# Patient Record
Sex: FEMALE | Race: WHITE | NOT HISPANIC OR LATINO | URBAN - METROPOLITAN AREA
[De-identification: names, ages, dates, MRNs, and addresses within clinical notes are randomized per-mention and may not be internally consistent; named-entity substitution may affect disease eponyms.]

---

## 2017-02-16 ENCOUNTER — RECORDS - HEALTHEAST (OUTPATIENT)
Dept: LAB | Facility: CLINIC | Age: 82
End: 2017-02-16

## 2017-02-16 LAB
CHOLEST SERPL-MCNC: 198 MG/DL
FASTING STATUS PATIENT QL REPORTED: NORMAL
HDLC SERPL-MCNC: 63 MG/DL
LDLC SERPL CALC-MCNC: 110 MG/DL
TRIGL SERPL-MCNC: 127 MG/DL

## 2018-01-01 ENCOUNTER — COMMUNICATION - HEALTHEAST (OUTPATIENT)
Dept: GERIATRICS | Facility: CLINIC | Age: 83
End: 2018-01-01

## 2018-01-01 ENCOUNTER — OFFICE VISIT - HEALTHEAST (OUTPATIENT)
Dept: GERIATRICS | Facility: CLINIC | Age: 83
End: 2018-01-01

## 2018-01-01 ENCOUNTER — RECORDS - HEALTHEAST (OUTPATIENT)
Dept: LAB | Facility: CLINIC | Age: 83
End: 2018-01-01

## 2018-01-01 DIAGNOSIS — N30.90 CYSTITIS: ICD-10-CM

## 2018-01-01 DIAGNOSIS — R53.81 PHYSICAL DECONDITIONING: ICD-10-CM

## 2018-01-01 DIAGNOSIS — I10 HYPERTENSION: ICD-10-CM

## 2018-01-01 DIAGNOSIS — R62.7 ADULT FAILURE TO THRIVE: ICD-10-CM

## 2018-01-01 DIAGNOSIS — L89.150 PRESSURE INJURY OF SACRAL REGION, UNSTAGEABLE (H): ICD-10-CM

## 2018-01-01 DIAGNOSIS — R41.0 DELIRIUM: ICD-10-CM

## 2018-01-01 DIAGNOSIS — R29.6 FALLS FREQUENTLY: ICD-10-CM

## 2018-01-01 DIAGNOSIS — R46.89 COGNITIVE AND BEHAVIORAL CHANGES: ICD-10-CM

## 2018-01-01 DIAGNOSIS — R53.1 WEAKNESS: ICD-10-CM

## 2018-01-01 DIAGNOSIS — J44.9 CHRONIC OBSTRUCTIVE PULMONARY DISEASE, UNSPECIFIED COPD TYPE (H): ICD-10-CM

## 2018-01-01 DIAGNOSIS — E66.9 OBESITY: ICD-10-CM

## 2018-01-01 DIAGNOSIS — I87.2 CHRONIC VENOUS INSUFFICIENCY: ICD-10-CM

## 2018-01-01 DIAGNOSIS — N30.20 CHRONIC CYSTITIS: ICD-10-CM

## 2018-01-01 DIAGNOSIS — F03.91 DEMENTIA WITH BEHAVIORAL DISTURBANCE, UNSPECIFIED DEMENTIA TYPE: ICD-10-CM

## 2018-01-01 DIAGNOSIS — R41.89 COGNITIVE AND BEHAVIORAL CHANGES: ICD-10-CM

## 2018-01-01 DIAGNOSIS — M31.6 TEMPORAL ARTERITIS (H): ICD-10-CM

## 2018-01-01 DIAGNOSIS — N39.0 LOWER URINARY TRACT INFECTIOUS DISEASE: ICD-10-CM

## 2018-01-01 DIAGNOSIS — E87.6 HYPOKALEMIA: ICD-10-CM

## 2018-01-01 DIAGNOSIS — R45.1 AGITATION: ICD-10-CM

## 2018-01-01 DIAGNOSIS — I89.0 LYMPHEDEMA: ICD-10-CM

## 2018-01-01 DIAGNOSIS — I10 ESSENTIAL HYPERTENSION: ICD-10-CM

## 2018-01-01 DIAGNOSIS — R44.3 HALLUCINATIONS: ICD-10-CM

## 2018-01-01 LAB
ALBUMIN SERPL-MCNC: 2.7 G/DL (ref 3.5–5)
ALBUMIN SERPL-MCNC: 3 G/DL (ref 3.5–5)
ALBUMIN SERPL-MCNC: 3.2 G/DL (ref 3.5–5)
ALBUMIN UR-MCNC: NEGATIVE MG/DL
ALP SERPL-CCNC: 65 U/L (ref 45–120)
ALP SERPL-CCNC: 72 U/L (ref 45–120)
ALP SERPL-CCNC: 80 U/L (ref 45–120)
ALT SERPL W P-5'-P-CCNC: 11 U/L (ref 0–45)
ALT SERPL W P-5'-P-CCNC: <9 U/L (ref 0–45)
ALT SERPL W P-5'-P-CCNC: <9 U/L (ref 0–45)
AMMONIA PLAS-SCNC: 22 UMOL/L (ref 11–35)
ANION GAP SERPL CALCULATED.3IONS-SCNC: 12 MMOL/L (ref 5–18)
ANION GAP SERPL CALCULATED.3IONS-SCNC: 13 MMOL/L (ref 5–18)
ANION GAP SERPL CALCULATED.3IONS-SCNC: 14 MMOL/L (ref 5–18)
APPEARANCE UR: CLEAR
AST SERPL W P-5'-P-CCNC: 11 U/L (ref 0–40)
AST SERPL W P-5'-P-CCNC: 15 U/L (ref 0–40)
AST SERPL W P-5'-P-CCNC: 19 U/L (ref 0–40)
BASOPHILS # BLD AUTO: 0 THOU/UL (ref 0–0.2)
BASOPHILS NFR BLD AUTO: 0 % (ref 0–2)
BILIRUB DIRECT SERPL-MCNC: 0.1 MG/DL
BILIRUB SERPL-MCNC: 0.3 MG/DL (ref 0–1)
BILIRUB SERPL-MCNC: 0.3 MG/DL (ref 0–1)
BILIRUB SERPL-MCNC: 0.4 MG/DL (ref 0–1)
BILIRUB UR QL STRIP: NEGATIVE
BUN SERPL-MCNC: 22 MG/DL (ref 8–28)
BUN SERPL-MCNC: 22 MG/DL (ref 8–28)
BUN SERPL-MCNC: 24 MG/DL (ref 8–28)
C REACTIVE PROTEIN LHE: 1.8 MG/DL (ref 0–0.8)
C REACTIVE PROTEIN LHE: 4.8 MG/DL (ref 0–0.8)
CALCIUM SERPL-MCNC: 8.5 MG/DL (ref 8.5–10.5)
CALCIUM SERPL-MCNC: 8.6 MG/DL (ref 8.5–10.5)
CALCIUM SERPL-MCNC: 9.3 MG/DL (ref 8.5–10.5)
CHLORIDE BLD-SCNC: 101 MMOL/L (ref 98–107)
CHLORIDE BLD-SCNC: 96 MMOL/L (ref 98–107)
CHLORIDE BLD-SCNC: 97 MMOL/L (ref 98–107)
CHOLEST SERPL-MCNC: 164 MG/DL
CO2 SERPL-SCNC: 29 MMOL/L (ref 22–31)
CO2 SERPL-SCNC: 30 MMOL/L (ref 22–31)
CO2 SERPL-SCNC: 32 MMOL/L (ref 22–31)
COLOR UR AUTO: NORMAL
CREAT SERPL-MCNC: 1.1 MG/DL (ref 0.6–1.1)
CREAT SERPL-MCNC: 1.26 MG/DL (ref 0.6–1.1)
CREAT SERPL-MCNC: 1.31 MG/DL (ref 0.6–1.1)
EOSINOPHIL # BLD AUTO: 0.5 THOU/UL (ref 0–0.4)
EOSINOPHIL NFR BLD AUTO: 5 % (ref 0–6)
ERYTHROCYTE [DISTWIDTH] IN BLOOD BY AUTOMATED COUNT: 15 % (ref 11–14.5)
ERYTHROCYTE [DISTWIDTH] IN BLOOD BY AUTOMATED COUNT: 15.3 % (ref 11–14.5)
FASTING STATUS PATIENT QL REPORTED: NO
FOLATE SERPL-MCNC: 11.9 NG/ML
GFR SERPL CREATININE-BSD FRML MDRD: 39 ML/MIN/1.73M2
GFR SERPL CREATININE-BSD FRML MDRD: 41 ML/MIN/1.73M2
GFR SERPL CREATININE-BSD FRML MDRD: 48 ML/MIN/1.73M2
GLUCOSE BLD-MCNC: 107 MG/DL (ref 70–125)
GLUCOSE BLD-MCNC: 141 MG/DL (ref 70–125)
GLUCOSE BLD-MCNC: 96 MG/DL (ref 70–125)
GLUCOSE UR STRIP-MCNC: NEGATIVE MG/DL
HCT VFR BLD AUTO: 33.9 % (ref 35–47)
HCT VFR BLD AUTO: 34.2 % (ref 35–47)
HDLC SERPL-MCNC: 62 MG/DL
HGB BLD-MCNC: 10.1 G/DL (ref 12–16)
HGB BLD-MCNC: 10.2 G/DL (ref 12–16)
HGB UR QL STRIP: NEGATIVE
KETONES UR STRIP-MCNC: NEGATIVE MG/DL
LDLC SERPL CALC-MCNC: 88 MG/DL
LEUKOCYTE ESTERASE UR QL STRIP: NEGATIVE
LYMPHOCYTES # BLD AUTO: 2.9 THOU/UL (ref 0.8–4.4)
LYMPHOCYTES NFR BLD AUTO: 32 % (ref 20–40)
MCH RBC QN AUTO: 28.5 PG (ref 27–34)
MCH RBC QN AUTO: 28.7 PG (ref 27–34)
MCHC RBC AUTO-ENTMCNC: 29.8 G/DL (ref 32–36)
MCHC RBC AUTO-ENTMCNC: 29.8 G/DL (ref 32–36)
MCV RBC AUTO: 96 FL (ref 80–100)
MCV RBC AUTO: 96 FL (ref 80–100)
MONOCYTES # BLD AUTO: 0.7 THOU/UL (ref 0–0.9)
MONOCYTES NFR BLD AUTO: 7 % (ref 2–10)
NEUTROPHILS # BLD AUTO: 4.8 THOU/UL (ref 2–7.7)
NEUTROPHILS NFR BLD AUTO: 55 % (ref 50–70)
NITRATE UR QL: NEGATIVE
PH UR STRIP: 6.5 [PH] (ref 4.5–8)
PLATELET # BLD AUTO: 294 THOU/UL (ref 140–440)
PLATELET # BLD AUTO: 306 THOU/UL (ref 140–440)
PMV BLD AUTO: 9.1 FL (ref 8.5–12.5)
PMV BLD AUTO: 9.7 FL (ref 8.5–12.5)
POTASSIUM BLD-SCNC: 3.3 MMOL/L (ref 3.5–5)
POTASSIUM BLD-SCNC: 4 MMOL/L (ref 3.5–5)
POTASSIUM BLD-SCNC: 4.1 MMOL/L (ref 3.5–5)
POTASSIUM BLD-SCNC: 4.2 MMOL/L (ref 3.5–5)
PROT SERPL-MCNC: 6.6 G/DL (ref 6–8)
PROT SERPL-MCNC: 6.7 G/DL (ref 6–8)
PROT SERPL-MCNC: 7.1 G/DL (ref 6–8)
RBC # BLD AUTO: 3.54 MILL/UL (ref 3.8–5.4)
RBC # BLD AUTO: 3.56 MILL/UL (ref 3.8–5.4)
SODIUM SERPL-SCNC: 140 MMOL/L (ref 136–145)
SODIUM SERPL-SCNC: 141 MMOL/L (ref 136–145)
SODIUM SERPL-SCNC: 143 MMOL/L (ref 136–145)
SP GR UR STRIP: 1.01 (ref 1–1.03)
TRIGL SERPL-MCNC: 72 MG/DL
TSH SERPL DL<=0.005 MIU/L-ACNC: 1.83 UIU/ML (ref 0.3–5)
UROBILINOGEN UR STRIP-ACNC: NORMAL
VALPROATE SERPL-MCNC: 21.5 UG/ML (ref 50–150)
VALPROATE SERPL-MCNC: 23.9 UG/ML (ref 50–150)
VIT B12 SERPL-MCNC: 409 PG/ML (ref 213–816)
WBC: 10.4 THOU/UL (ref 4–11)
WBC: 8.9 THOU/UL (ref 4–11)

## 2018-01-01 RX ORDER — DIVALPROEX SODIUM 125 MG/1
250 TABLET, DELAYED RELEASE ORAL AT BEDTIME
Status: SHIPPED | COMMUNITY
Start: 2018-01-01

## 2018-01-31 ENCOUNTER — RECORDS - HEALTHEAST (OUTPATIENT)
Dept: LAB | Facility: CLINIC | Age: 83
End: 2018-01-31

## 2018-02-02 LAB
BACTERIA SPEC CULT: ABNORMAL
BACTERIA SPEC CULT: ABNORMAL

## 2018-03-08 ENCOUNTER — RECORDS - HEALTHEAST (OUTPATIENT)
Dept: LAB | Facility: CLINIC | Age: 83
End: 2018-03-08

## 2018-03-11 LAB
BACTERIA SPEC CULT: ABNORMAL
BACTERIA SPEC CULT: ABNORMAL

## 2018-03-13 ENCOUNTER — HOME CARE/HOSPICE - HEALTHEAST (OUTPATIENT)
Dept: HOME HEALTH SERVICES | Facility: HOME HEALTH | Age: 83
End: 2018-03-13

## 2019-01-01 ENCOUNTER — OFFICE VISIT - HEALTHEAST (OUTPATIENT)
Dept: GERIATRICS | Facility: CLINIC | Age: 84
End: 2019-01-01

## 2019-01-01 ENCOUNTER — RECORDS - HEALTHEAST (OUTPATIENT)
Dept: LAB | Facility: CLINIC | Age: 84
End: 2019-01-01

## 2019-01-01 ENCOUNTER — COMMUNICATION - HEALTHEAST (OUTPATIENT)
Dept: GERIATRICS | Facility: CLINIC | Age: 84
End: 2019-01-01

## 2019-01-01 DIAGNOSIS — R29.6 FALLS FREQUENTLY: ICD-10-CM

## 2019-01-01 DIAGNOSIS — N30.20 CHRONIC CYSTITIS: ICD-10-CM

## 2019-01-01 DIAGNOSIS — I10 ESSENTIAL HYPERTENSION: ICD-10-CM

## 2019-01-01 DIAGNOSIS — N39.0 LOWER URINARY TRACT INFECTIOUS DISEASE: ICD-10-CM

## 2019-01-01 DIAGNOSIS — I87.2 CHRONIC VENOUS INSUFFICIENCY: ICD-10-CM

## 2019-01-01 DIAGNOSIS — R46.89 COGNITIVE AND BEHAVIORAL CHANGES: ICD-10-CM

## 2019-01-01 DIAGNOSIS — R41.89 COGNITIVE AND BEHAVIORAL CHANGES: ICD-10-CM

## 2019-01-01 DIAGNOSIS — F03.91 DEMENTIA WITH BEHAVIORAL DISTURBANCE, UNSPECIFIED DEMENTIA TYPE: ICD-10-CM

## 2019-01-01 DIAGNOSIS — M31.6 TEMPORAL ARTERITIS (H): ICD-10-CM

## 2019-01-01 DIAGNOSIS — J44.9 CHRONIC OBSTRUCTIVE PULMONARY DISEASE, UNSPECIFIED COPD TYPE (H): ICD-10-CM

## 2019-01-01 DIAGNOSIS — F22 DELUSIONS (H): ICD-10-CM

## 2019-01-01 LAB
ALBUMIN SERPL-MCNC: 2.9 G/DL (ref 3.5–5)
ALBUMIN UR-MCNC: ABNORMAL MG/DL
ALBUMIN UR-MCNC: NEGATIVE MG/DL
ALP SERPL-CCNC: 77 U/L (ref 45–120)
ALT SERPL W P-5'-P-CCNC: 21 U/L (ref 0–45)
ANION GAP SERPL CALCULATED.3IONS-SCNC: 13 MMOL/L (ref 5–18)
ANION GAP SERPL CALCULATED.3IONS-SCNC: 16 MMOL/L (ref 5–18)
APPEARANCE UR: ABNORMAL
APPEARANCE UR: CLEAR
AST SERPL W P-5'-P-CCNC: 25 U/L (ref 0–40)
BACTERIA #/AREA URNS HPF: ABNORMAL HPF
BACTERIA #/AREA URNS HPF: ABNORMAL HPF
BACTERIA SPEC CULT: ABNORMAL
BILIRUB SERPL-MCNC: 0.4 MG/DL (ref 0–1)
BILIRUB UR QL STRIP: NEGATIVE
BILIRUB UR QL STRIP: NEGATIVE
BUN SERPL-MCNC: 23 MG/DL (ref 8–28)
BUN SERPL-MCNC: 25 MG/DL (ref 8–28)
C REACTIVE PROTEIN LHE: 3.9 MG/DL (ref 0–0.8)
CALCIUM SERPL-MCNC: 8.3 MG/DL (ref 8.5–10.5)
CALCIUM SERPL-MCNC: 8.8 MG/DL (ref 8.5–10.5)
CHLORIDE BLD-SCNC: 88 MMOL/L (ref 98–107)
CHLORIDE BLD-SCNC: 89 MMOL/L (ref 98–107)
CO2 SERPL-SCNC: 33 MMOL/L (ref 22–31)
CO2 SERPL-SCNC: 35 MMOL/L (ref 22–31)
COLOR UR AUTO: YELLOW
COLOR UR AUTO: YELLOW
CREAT SERPL-MCNC: 1.78 MG/DL (ref 0.6–1.1)
CREAT SERPL-MCNC: 2 MG/DL (ref 0.6–1.1)
ERYTHROCYTE [DISTWIDTH] IN BLOOD BY AUTOMATED COUNT: 14.7 % (ref 11–14.5)
ERYTHROCYTE [DISTWIDTH] IN BLOOD BY AUTOMATED COUNT: 14.7 % (ref 11–14.5)
ERYTHROCYTE [SEDIMENTATION RATE] IN BLOOD BY WESTERGREN METHOD: 97 MM/HR (ref 0–20)
GFR SERPL CREATININE-BSD FRML MDRD: 24 ML/MIN/1.73M2
GFR SERPL CREATININE-BSD FRML MDRD: 27 ML/MIN/1.73M2
GLUCOSE BLD-MCNC: 124 MG/DL (ref 70–125)
GLUCOSE BLD-MCNC: 160 MG/DL (ref 70–125)
GLUCOSE UR STRIP-MCNC: NEGATIVE MG/DL
GLUCOSE UR STRIP-MCNC: NEGATIVE MG/DL
HCT VFR BLD AUTO: 32.7 % (ref 35–47)
HCT VFR BLD AUTO: 34.9 % (ref 35–47)
HGB BLD-MCNC: 10.1 G/DL (ref 12–16)
HGB BLD-MCNC: 10.4 G/DL (ref 12–16)
HGB UR QL STRIP: ABNORMAL
HGB UR QL STRIP: NEGATIVE
HYALINE CASTS #/AREA URNS LPF: ABNORMAL LPF
KETONES UR STRIP-MCNC: NEGATIVE MG/DL
KETONES UR STRIP-MCNC: NEGATIVE MG/DL
LEUKOCYTE ESTERASE UR QL STRIP: ABNORMAL
LEUKOCYTE ESTERASE UR QL STRIP: ABNORMAL
MCH RBC QN AUTO: 29.1 PG (ref 27–34)
MCH RBC QN AUTO: 30 PG (ref 27–34)
MCHC RBC AUTO-ENTMCNC: 29.8 G/DL (ref 32–36)
MCHC RBC AUTO-ENTMCNC: 30.9 G/DL (ref 32–36)
MCV RBC AUTO: 97 FL (ref 80–100)
MCV RBC AUTO: 98 FL (ref 80–100)
MUCOUS THREADS #/AREA URNS LPF: ABNORMAL LPF
NITRATE UR QL: NEGATIVE
NITRATE UR QL: NEGATIVE
PH UR STRIP: 6 [PH] (ref 4.5–8)
PH UR STRIP: 6.5 [PH] (ref 4.5–8)
PLATELET # BLD AUTO: 199 THOU/UL (ref 140–440)
PLATELET # BLD AUTO: 224 THOU/UL (ref 140–440)
PMV BLD AUTO: 9.1 FL (ref 8.5–12.5)
PMV BLD AUTO: 9.3 FL (ref 8.5–12.5)
POTASSIUM BLD-SCNC: 3.6 MMOL/L (ref 3.5–5)
POTASSIUM BLD-SCNC: 4 MMOL/L (ref 3.5–5)
PROCALCITONIN SERPL-MCNC: 0.04 NG/ML (ref 0–0.49)
PROT SERPL-MCNC: 7.1 G/DL (ref 6–8)
RBC # BLD AUTO: 3.37 MILL/UL (ref 3.8–5.4)
RBC # BLD AUTO: 3.57 MILL/UL (ref 3.8–5.4)
RBC #/AREA URNS AUTO: ABNORMAL HPF
RBC #/AREA URNS AUTO: ABNORMAL HPF
SODIUM SERPL-SCNC: 137 MMOL/L (ref 136–145)
SODIUM SERPL-SCNC: 137 MMOL/L (ref 136–145)
SP GR UR STRIP: 1.01 (ref 1–1.03)
SP GR UR STRIP: 1.02 (ref 1–1.03)
SQUAMOUS #/AREA URNS AUTO: ABNORMAL LPF
SQUAMOUS #/AREA URNS AUTO: ABNORMAL LPF
UROBILINOGEN UR STRIP-ACNC: ABNORMAL
UROBILINOGEN UR STRIP-ACNC: ABNORMAL
VALPROATE SERPL-MCNC: 39.6 UG/ML (ref 50–150)
VALPROATE SERPL-MCNC: 43.2 UG/ML (ref 50–150)
VALPROATE SERPL-MCNC: 50.1 UG/ML (ref 50–150)
WBC #/AREA URNS AUTO: ABNORMAL HPF
WBC #/AREA URNS AUTO: ABNORMAL HPF
WBC: 7.4 THOU/UL (ref 4–11)
WBC: 8.5 THOU/UL (ref 4–11)

## 2019-04-24 ENCOUNTER — AMBULATORY - HEALTHEAST (OUTPATIENT)
Dept: GERIATRICS | Facility: CLINIC | Age: 84
End: 2019-04-24

## 2021-05-27 ENCOUNTER — RECORDS - HEALTHEAST (OUTPATIENT)
Dept: ADMINISTRATIVE | Facility: CLINIC | Age: 86
End: 2021-05-27

## 2021-05-30 ENCOUNTER — RECORDS - HEALTHEAST (OUTPATIENT)
Dept: ADMINISTRATIVE | Facility: CLINIC | Age: 86
End: 2021-05-30

## 2021-06-01 ENCOUNTER — RECORDS - HEALTHEAST (OUTPATIENT)
Dept: ADMINISTRATIVE | Facility: CLINIC | Age: 86
End: 2021-06-01

## 2021-06-02 VITALS — WEIGHT: 170.2 LBS | BODY MASS INDEX: 33.24 KG/M2

## 2021-06-02 VITALS — WEIGHT: 162.4 LBS | BODY MASS INDEX: 31.72 KG/M2

## 2021-06-02 VITALS — BODY MASS INDEX: 31.72 KG/M2 | WEIGHT: 162.4 LBS

## 2021-06-02 VITALS — BODY MASS INDEX: 33.47 KG/M2 | WEIGHT: 171.4 LBS

## 2021-06-02 VITALS — BODY MASS INDEX: 33.4 KG/M2 | WEIGHT: 171 LBS

## 2021-06-02 VITALS — BODY MASS INDEX: 32.11 KG/M2 | WEIGHT: 164.4 LBS

## 2021-06-02 VITALS — BODY MASS INDEX: 32.65 KG/M2 | WEIGHT: 167.2 LBS

## 2021-06-02 VITALS — WEIGHT: 170.1 LBS | BODY MASS INDEX: 33.22 KG/M2

## 2021-06-16 PROBLEM — G93.40 ENCEPHALOPATHY: Status: ACTIVE | Noted: 2019-01-01

## 2021-06-16 PROBLEM — F03.90 DEMENTIA (H): Chronic | Status: ACTIVE | Noted: 2018-01-01

## 2021-06-16 PROBLEM — E83.42 HYPOMAGNESEMIA: Status: ACTIVE | Noted: 2019-01-01

## 2021-06-16 PROBLEM — I48.0 PAROXYSMAL ATRIAL FIBRILLATION (H): Status: ACTIVE | Noted: 2019-01-01

## 2021-06-16 PROBLEM — I87.2 CHRONIC VENOUS INSUFFICIENCY: Chronic | Status: ACTIVE | Noted: 2018-01-01

## 2021-06-16 PROBLEM — R29.6 FALLS FREQUENTLY: Chronic | Status: ACTIVE | Noted: 2018-01-01

## 2021-06-16 PROBLEM — R79.89 ELEVATED TROPONIN LEVEL: Status: ACTIVE | Noted: 2019-01-01

## 2021-06-16 PROBLEM — R62.7 ADULT FAILURE TO THRIVE: Chronic | Status: ACTIVE | Noted: 2018-01-01

## 2021-06-16 PROBLEM — D64.9 ANEMIA: Status: ACTIVE | Noted: 2019-01-01

## 2021-06-16 PROBLEM — J69.0 ASPIRATION PNEUMONIA (H): Status: ACTIVE | Noted: 2019-01-01

## 2021-06-16 PROBLEM — R53.81 PHYSICAL DECONDITIONING: Status: ACTIVE | Noted: 2018-01-01

## 2021-06-16 PROBLEM — L89.150 PRESSURE INJURY OF SACRAL REGION, UNSTAGEABLE (H): Status: ACTIVE | Noted: 2018-01-01

## 2021-06-18 NOTE — LETTER
Letter by Urmila Luong MD at      Author: Urmila Luong MD Service: -- Author Type: --    Filed:  Encounter Date: 1/31/2019 Status: (Other)         Patient: Li Winston   MR Number: [id number]   YOB: 1935   Date of Visit: 1/31/2019     [facility]      Facility:    [facility] [[id number]]    Code Status: FULL CODE ( Had been DNR in hospital, son stated she wanted it full code)  [facility]    10/15 through 10/18/18  [facility]    11/2 through 11/6/18        Chief Complaint/Reason for Visit:  Chief Complaint   Patient presents with   ? Review Of Multiple Medical Conditions       HPI:   Li is a 83 y.o. female who been living in her own home with failure to thrive, had bad legs at home, not compliant with dressing and support hose, would not always take her medications, especially Lasix because she did not want to have to go to the bathroom so often, she often had wet diapers, she was having frequent falls, developed a pressure ulcer of the sacral region.  Her decline was especially notable for the previous month.  She was admitted to the hospital and found to have CK= 381( mild rhabdomyolysis).  She was treated with IV fluids, was given ceftriaxone IV for a questionable UA.  She received a total of 3 days of IV antibiotics.  The culture itself had no growth.  Per her son, she has been on suppressive antibiotic (trimethoprim 100 mg every evening) for approximately 9 months per urologist they visited.    Her son said she really was not a candidate to go home to live alone.  They are searching for long-term care/assisted living facility.    She was in TCU at [facility]: she had maría delerium and hallucinations, significant behaviors. She was sent to the ED: had a UTI ( UA was checked a few days earlier and was negative). Psychiatry started her on Seroquel with some improvement in her delirium. She transferred back to TCU, but had  escalating and ongoing hallucinations and behaviors.  Seroquel was added. She completed therapy, was in need of LTC, and transferred to LTC on the third floor at [facility].    Other medical issues:  History of COPD  Hypertension  Temporal arteritis on low-dose steroids for many years (approximately 15)  Dyslipidemia.  Osteopenia  Obesity  History of temporal arteritis still on steroids  .    UPDATE:  Has had delerium, verbal aggression, exit seeking behavior    Past Medical History:  Past Medical History:   Diagnosis Date   ? Arthritis    ? Chronic respiratory failure (H)     home O2 therapy   ? Constipation     son states pt. has a little trouble with constipation--takes miralax   ? COPD (chronic obstructive pulmonary disease) (H)    ? Diabetes mellitus (H)     son states thinks she was borderline diabetic 20 yrs ago--just watched her diet at that time   ? Hyperlipidemia    ? Hypertension    ? IBS (irritable bowel syndrome)     not as of 9/21/14   ? Influenza due to influenza virus, type A, human    ? Obesity    ? Osteopenia    ? Temporal arteritis (H)                   Review of Systems   Lethargic, responds to questions but little content in her speech.  Refuses cares at times  Due to significant dementia, she can not answer ROS    Vitals:    01/31/19 0900   BP: 155/73   Pulse: 78   Resp: 18   Temp: 97.3  F (36.3  C)   SpO2: 94%       Physical Exam    Constitutional: Elderly morbidly obese, hard of hearing   Left lower eyelid cyst, no erythema.   Cardiovascular: Regular rhythm and normal heart sounds. No murmur.   Pulmonary/Chest: Breath sounds clear  Abdominal: Soft. Bowel sounds are normal, no tenderness.   Musculoskeletal:Limited shoulder range of motion  Hand grasp 4/5, thigh flexors extensors 3/5   Neurological: She is drowsy, slouching in wheelchair.Clearly  Significant short-term memory deficits  No asymmetry   Skin: Skin is warm. Marked Lymphedema of both lower extremities, dark pink coloration. No  support hose in place  Psychiatric: Fairly flat affect.     On Bactrim for suppression of frequent cyctitis      Allergies   Allergen Reactions   ? Lorazepam Other (See Comments)     Confusion and it didn't work.   ? Other Environmental Allergy      Allergies to strong scents - soap, perfume   ? Statins-Hmg-Coa Reductase Inhibitors Myalgia     With elevated CK   ? [place]    ? Hydrocodone Rash   ? Prevacid [Lansoprazole]      Reaction: Stomach upset       Medication List:  Current Outpatient Medications   Medication Sig   ? albuterol (PROVENTIL HFA;VENTOLIN HFA) 90 mcg/actuation inhaler Inhale 2 puffs every 4 (four) hours as needed for wheezing.   ? aspirin 81 mg chewable tablet Chew 81 mg Daily at 8:00 am..   ? divalproex (DEPAKOTE DR) 125 MG 12 hour tablet Take 125 mg by mouth 2 (two) times a day.   ? furosemide (LASIX) 20 MG tablet Take by mouth see administration instructions. 40 mg in the morning and 20 mg in the afternoon.   ? ibuprofen (ADVIL,MOTRIN) 200 MG tablet Take 200 mg by mouth every 8 (eight) hours as needed for pain. OTC    ? metoprolol (TOPROL-XL) 50 MG 24 hr tablet Take 50 mg by mouth daily.   ? OXYGEN-AIR DELIVERY SYSTEMS MISC 2 L into each nostril At night PRN (SOB).   ? pediatric multivitamin (CHILDREN'S MULTIVIT W/EXTRA C) Chew chewable tablet Chew 1 tablet daily.   ? PREDNISONE ORAL Take 9 mg by mouth daily with breakfast.    ? QUEtiapine (SEROQUEL) 25 MG tablet Take 0.5 tablets (12.5 mg total) by mouth 3 (three) times a day for 2 days.   ? QUEtiapine (SEROQUEL) 50 MG tablet Take 1 tablet (50 mg total) by mouth at bedtime. (Patient taking differently: Take 50 mg by mouth 4 (four) times a day .      )   ? senna (SENOKOT) 8.6 mg tablet Take 2 tablets by mouth 2 (two) times a day as needed for constipation.   ? trimethoprim (TRIMPEX) 100 mg tablet Take 100 mg by mouth every evening.         Labs:   Ref Range & Units 11/28/18 0652   Potassium 3.5 - 5.0 mmol/L 4.2       Ref Range & Units 11/28/18  0652 11/19/18 0510   Valproic Acid 50.0 - 150.0 ug/mL 23.9 Abnormally low   21.5 Abnormally low  CM      Ref Range & Units 11/19/18 0510   Ammonia 11 - 35 umol/L 22       Ref Range & Units 11/19/18 0510 11/6/18 0729   WBC 4.0 - 11.0 thou/uL 8.9  10.3    RBC 3.80 - 5.40 mill/uL 3.54 Abnormally low   3.49 Abnormally low     Hemoglobin 12.0 - 16.0 g/dL 10.1 Abnormally low   10.1 Abnormally low     Hematocrit 35.0 - 47.0 % 33.9 Abnormally low   34.1 Abnormally low     MCV 80 - 100 fL 96  98    MCH 27.0 - 34.0 pg 28.5  28.9    MCHC 32.0 - 36.0 g/dL 29.8 Abnormally low   29.6 Abnormally low     RDW 11.0 - 14.5 % 15.3 Abnormally high   15.2 Abnormally high     Platelets 140 - 440 thou/uL 294  273         Ref Range & Units 11/6/18 0729   Sodium 136 - 145 mmol/L 140    Potassium 3.5 - 5.0 mmol/L 4.3    Chloride 98 - 107 mmol/L 101    CO2 22 - 31 mmol/L 28    Anion Gap, Calculation 5 - 18 mmol/L 11    Glucose 70 - 125 mg/dL 87    Calcium 8.5 - 10.5 mg/dL 8.7    BUN 8 - 28 mg/dL 23    Creatinine 0.60 - 1.10 mg/dL 1.16 Abnormally high     GFR MDRD Non Af Amer >60 mL/min/1.73m2 45 Abnormally low         Ref Range & Units 11/6/18    WBC 4.0 - 11.0 thou/uL 10.3    Hemoglobin 12.0 - 16.0 g/dL 10.1 Abnormally low     RDW 11.0 - 14.5 % 15.2 Abnormally high     Platelets 140 - 440 thou/uL 273       Ref Range & Units 11/6/18    Magnesium 1.8 - 2.6 mg/dL 2.3             Assessment / Plan:    ICD-10-CM    1. Dementia with behavioral disturbance, unspecified dementia type F03.91 Depakote and Seroquel helping some    2. Chronic obstructive pulmonary disease, unspecified COPD type (H) J44.9 Prednisone   3. Essential hypertension I10 Acceptable systolics for her age on Metoprolol   4. Chronic venous insufficiency I87.2 pInactive, stable, not using suoport hose   5. Chronic cystitis N30.20 Chronic Trimethoprim             Electronically signed by: Urmila Luong MD

## 2021-06-18 NOTE — LETTER
"Letter by Mayela Yañez CNP at      Author: Mayela Yañez CNP Service: -- Author Type: --    Filed:  Encounter Date: 3/11/2019 Status: (Other)         Patient: Li Winston   MR Number: [id number]   YOB: 1935   Date of Visit: 3/11/2019     [facility]    Facility:   [facility] [[id number]]   Code Status: FULL CODE      CHIEF COMPLAINT/REASON FOR VISIT:  Chief Complaint   Patient presents with   ? Problem Visit     falls, behavioral changes       HISTORY:      HPI: Li is a 83 y.o. female who been living in her own home with failure to thrive, had bad legs at home, not compliant with dressing and support hose, would not always take her medications, especially Lasix because she did not want to have to go to the bathroom so often, she often had wet diapers, she was having frequent falls, developed a pressure ulcer of the sacral region. Her decline was especially notable for the previous month. Li was admitted to the hospital and found to have CK= 381( mild rhabdomyolysis). She was treated with IV fluids, was given ceftriaxone IV for a questionable UA. She received a total of 3 days of IV antibiotics. The culture itself had no growth. Per her son, she has been on suppressive antibiotic (trimethoprim 100 mg every evening) for approximately 9 months per urologist they visited. Li has been transferred to LT.     Today Li is being evaluated after having several falls over the weekend and several issues with behavioral changes. Li feels she is fine, she is very confused (confusion is her normal state). However, nursing is states she is off and is just not \"right\". Li does not have any new issues to report. She is feeling otherwise ok. She does wince in pain or cry in pain with palpations to any of her bruised areas. Li does not have any other concerns or reports.     Past Medical History:   Diagnosis Date   ? Arthritis    ? Chronic respiratory failure (H)  "    home O2 therapy   ? Constipation     son states pt. has a little trouble with constipation--takes miralax   ? COPD (chronic obstructive pulmonary disease) (H)    ? Diabetes mellitus (H)     son states thinks she was borderline diabetic 20 yrs ago--just watched her diet at that time   ? Hyperlipidemia    ? Hypertension    ? IBS (irritable bowel syndrome)     not as of 14   ? Influenza due to influenza virus, type A, human    ? Obesity    ? Osteopenia    ? Temporal arteritis (H)              Family History   Problem Relation Age of Onset   ? Heart disease Father    ? Cancer Sister      Social History     Socioeconomic History   ? Marital status:      Spouse name: None   ? Number of children: None   ? Years of education: None   ? Highest education level: None   Occupational History   ? None   Social Needs   ? Financial resource strain: None   ? Food insecurity:     Worry: None     Inability: None   ? Transportation needs:     Medical: None     Non-medical: None   Tobacco Use   ? Smoking status: Former Smoker     Packs/day: 0.50     Years: 10.00     Pack years: 5.00     Last attempt to quit: 2000     Years since quittin.1   ? Smokeless tobacco: Never Used   ? Tobacco comment: at least 10 yrs she smoked   Substance and Sexual Activity   ? Alcohol use: No   ? Drug use: No   ? Sexual activity: None   Lifestyle   ? Physical activity:     Days per week: None     Minutes per session: None   ? Stress: None   Relationships   ? Social connections:     Talks on phone: None     Gets together: None     Attends Anabaptist service: None     Active member of club or organization: None     Attends meetings of clubs or organizations: None     Relationship status: None   ? Intimate partner violence:     Fear of current or ex partner: None     Emotionally abused: None     Physically abused: None     Forced sexual activity: None   Other Topics Concern   ? None   Social History Narrative   ? None       REVIEW OF  SYSTEM:  Per HPI    PHYSICAL EXAM:   General appearance: alert, appears stated age and cooperative  HEENT: Head is normocephalic with normal hair distribution. Facial trauma with bruising and hematoma to forehead. Ears: Without lesions or deformity. No acute purulent discharge. [place]. Eyes: Conjunctivae pink with no scleral icterus or erythema. Nose: Normal mucosa and septum. Oropharnyx: mmm, no lesions present.  Lungs: respirations without effort, LS CTA  Cardiovascular: S1, S2, irregular rhythm, regular rate  Abdomen: soft, tender to lower abdomen, +CVAT  Extremities: extremities normal, atraumatic, no cyanosis. Massive amounts of lymphedema present.   Back: kyphosis  Pulses: 2+ and symmetric  Skin: Skin color, texture, turgor normal. No rashes or lesions. Bruising over face, right shoulder.  Neurologic: Grossly normal   Psych: interacts well with caregivers, does NOT exhibits logical thought processes and connections--advanced dementia with delusions, pleasant    LABS:   CBC, CMP, Procalcitonin, UA/UC.     ASSESSMENT:      ICD-10-CM    1. Falls frequently R29.6    2. Cognitive and behavioral changes R41.89     R46.89        PLAN:    Falls  -Xray face, orbits, right shoulder, thoracic spine STAT.   -Ice to all bruised or tender areas 4 times daily at a minimum.   -Tylenol 500 mg by mouth every 6 hours.   -Sling to right arm for comfort.     Cognitive and Behavioral Changes  -CBC, CMP, Depakote level, Procalcitonin STAT.   -UA/UC.   -Careful monitoring.     Otherwise continue current care plan for all other chronic medical conditions. Encouraged patient to engage in healthy lifestyle behaviors such as engaging in social activities, exercising (PT/OT), eating well, and following care plan. Follow up for routine check-up, or sooner if needed. Will continue to monitor patient and work with nursing staff collaboratively to work toward positive patient outcomes.    Electronically signed by: Mayela Yañez CNP

## 2021-06-18 NOTE — LETTER
Letter by Mayela Yañez CNP at      Author: Mayela Yañez CNP Service: -- Author Type: --    Filed:  Encounter Date: 2/15/2019 Status: (Other)         Patient: Li Winston   MR Number: [id number]   YOB: 1935   Date of Visit: 2/15/2019     [facility]    Facility:   [facility] [[id number]]   Code Status: FULL CODE      CHIEF COMPLAINT/REASON FOR VISIT:  Chief Complaint   Patient presents with   ? Problem Visit     dysuria, behavior changes       HISTORY:      HPI: Li is a 83 y.o. female who been living in her own home with failure to thrive, had bad legs at home, not compliant with dressing and support hose, would not always take her medications, especially Lasix because she did not want to have to go to the bathroom so often, she often had wet diapers, she was having frequent falls, developed a pressure ulcer of the sacral region. Her decline was especially notable for the previous month. Li was admitted to the hospital and found to have CK= 381( mild rhabdomyolysis). She was treated with IV fluids, was given ceftriaxone IV for a questionable UA. She received a total of 3 days of IV antibiotics. The culture itself had no growth. Per her son, she has been on suppressive antibiotic (trimethoprim 100 mg every evening) for approximately 9 months per urologist they visited. Li has been transferred to [facility].     Today she is being evaluated for reports of hematuria and dysuria. This has been an issue for the past 3 to 4 days per nursing report. Li is quite demented and does not recall what is going on easily. She states her stomach is bothering her but she is mostly upset about her leg wraps. Li is perseverating on her leg wraps today and does not have anything further to add. She just does not want to wear the leg wraps because they are bothersome to her. She isn't sure if she has had any other symptoms or issues.      Past Medical History:   Diagnosis Date    ? Arthritis    ? Chronic respiratory failure (H)     home O2 therapy   ? Constipation     son states pt. has a little trouble with constipation--takes miralax   ? COPD (chronic obstructive pulmonary disease) (H)    ? Diabetes mellitus (H)     son states thinks she was borderline diabetic 20 yrs ago--just watched her diet at that time   ? Hyperlipidemia    ? Hypertension    ? IBS (irritable bowel syndrome)     not as of 14   ? Influenza due to influenza virus, type A, human    ? Obesity    ? Osteopenia    ? Temporal arteritis (H)              Family History   Problem Relation Age of Onset   ? Heart disease Father    ? Cancer Sister      Social History     Socioeconomic History   ? Marital status:      Spouse name: None   ? Number of children: None   ? Years of education: None   ? Highest education level: None   Social Needs   ? Financial resource strain: None   ? Food insecurity - worry: None   ? Food insecurity - inability: None   ? Transportation needs - medical: None   ? Transportation needs - non-medical: None   Occupational History   ? None   Tobacco Use   ? Smoking status: Former Smoker     Packs/day: 0.50     Years: 10.00     Pack years: 5.00     Last attempt to quit: 2000     Years since quittin.0   ? Smokeless tobacco: Never Used   ? Tobacco comment: at least 10 yrs she smoked   Substance and Sexual Activity   ? Alcohol use: No   ? Drug use: No   ? Sexual activity: None   Other Topics Concern   ? None   Social History Narrative   ? None       REVIEW OF SYSTEM:  Per HPI    PHYSICAL EXAM:   General appearance: alert, appears stated age and cooperative  HEENT: Head is normocephalic with normal hair distribution. No evidence of trauma. Ears: Without lesions or deformity. No acute purulent discharge. [place]. Eyes: Conjunctivae pink with no scleral icterus or erythema. Nose: Normal mucosa and septum. Oropharnyx: mmm, no lesions present.  Lungs: respirations without effort, LS  CTA  Cardiovascular: S1, S2, irregular rhythm, regular rate  Abdomen: soft, tender to lower abdomen, +CVAT  Extremities: extremities normal, atraumatic, no cyanosis. Massive amounts of lymphedema present.   Back: kyphosis  Pulses: 2+ and symmetric  Skin: Skin color, texture, turgor normal. No rashes or lesions. Legs wrapped and bandaged but history of blisters present  Neurologic: Grossly normal   Psych: interacts well with caregivers, does NOT exhibits logical thought processes and connections--advanced dementia with delusions, pleasant    LABS:   Lab Results   Component Value Date    COLORU Yellow 02/11/2019    CLARITYU Slightly Cloudy (!) 02/11/2019    GLUCOSEU Negative 02/11/2019    BILIRUBINUR Negative 02/11/2019    KETONESU Negative 02/11/2019    SPECGRAV 1.025 02/11/2019    HGBUA Large (!) 02/11/2019    PHUR 6.0 02/11/2019    PROTEINUA Negative 02/11/2019    UROBILINOGEN <2.0 E.U./dL 02/11/2019    NITRITE Negative 02/11/2019    LEUKOCYTESUR Moderate (!) 02/11/2019    BACTERIA Few (!) 02/11/2019    RBCUA 25-50 (!) 02/11/2019    WBCUA 5-10 (!) 02/11/2019    SQUAMEPI 0-5 02/11/2019     Culture   Date Value Ref Range Status   02/11/2019 >100,000 col/ml Enterococcus faecalis (!)  Final   02/11/2019 10,000-50,000 col/ml Proteus mirabilis (!)  Final       ASSESSMENT:      ICD-10-CM    1. UTI (lower urinary tract infection) N39.0        PLAN:    UTI  -Ampicillin 500 mg by mouth twice daily for 7 days.   -Encourage fluids.   -May consider the use of a prophylactic antibiotic or estrace cream to help address recurrent UTIs.   -Follow carefully and report any changes immediately to provider team.     Otherwise continue current care plan for all other chronic medical conditions. Encouraged patient to engage in healthy lifestyle behaviors such as engaging in social activities, exercising (PT/OT), eating well, and following care plan. Follow up for routine check-up, or sooner if needed. Will continue to monitor patient and  work with nursing staff collaboratively to work toward positive patient outcomes.    Electronically signed by: Mayela Yañez CNP

## 2021-06-18 NOTE — LETTER
Letter by Mayela Yañez CNP at      Author: Mayela Yañez CNP Service: -- Author Type: --    Filed:  Encounter Date: 1/30/2019 Status: (Other)         Patient: Li Winston   MR Number: [id number]   YOB: 1935   Date of Visit: 1/30/2019     [facility]    Facility:   [facility] [[id number]]   Code Status: FULL CODE      CHIEF COMPLAINT/REASON FOR VISIT:  Chief Complaint   Patient presents with   ? Problem Visit     dementia, delusions       HISTORY:      HPI: Li is a 83 y.o. female who been living in her own home with failure to thrive, had bad legs at home, not compliant with dressing and support hose, would not always take her medications, especially Lasix because she did not want to have to go to the bathroom so often, she often had wet diapers, she was having frequent falls, developed a pressure ulcer of the sacral region. Her decline was especially notable for the previous month. Li was admitted to the hospital and found to have CK= 381( mild rhabdomyolysis). She was treated with IV fluids, was given ceftriaxone IV for a questionable UA. She received a total of 3 days of IV antibiotics. The culture itself had no growth. Per her son, she has been on suppressive antibiotic (trimethoprim 100 mg every evening) for approximately 9 months per urologist they visited. Li has been transferred to LTC.     Today she is being evaluated for ongoing delusions. Li often has elaborate stories and delusions. She will often times be appropriate with her delusions and behaviors but there are times when Li will say very inappropriate things to staff and other residents. Family would like for Li to come off of the Seroquel and attempt to be more controlled with depakote. Li does not recognize these thoughts are untrue or not real. She does not have any new concerns. Li denies any concerns including fevers/chills, cough or cold symptoms, headaches, vision changes,  chest pain/pressure, difficulty breathing, SOB, abdominal pain, nausea, vomiting, diarrhea, dysuria, increasing weakness, increasing pain.     Past Medical History:   Diagnosis Date   ? Arthritis    ? Chronic respiratory failure (H)     home O2 therapy   ? Constipation     son states pt. has a little trouble with constipation--takes miralax   ? COPD (chronic obstructive pulmonary disease) (H)    ? Diabetes mellitus (H)     son states thinks she was borderline diabetic 20 yrs ago--just watched her diet at that time   ? Hyperlipidemia    ? Hypertension    ? IBS (irritable bowel syndrome)     not as of 14   ? Influenza due to influenza virus, type A, human    ? Obesity    ? Osteopenia    ? Temporal arteritis (H)              Family History   Problem Relation Age of Onset   ? Heart disease Father    ? Cancer Sister      Social History     Socioeconomic History   ? Marital status:      Spouse name: None   ? Number of children: None   ? Years of education: None   ? Highest education level: None   Social Needs   ? Financial resource strain: None   ? Food insecurity - worry: None   ? Food insecurity - inability: None   ? Transportation needs - medical: None   ? Transportation needs - non-medical: None   Occupational History   ? None   Tobacco Use   ? Smoking status: Former Smoker     Packs/day: 0.50     Years: 10.00     Pack years: 5.00     Last attempt to quit: 2000     Years since quittin.0   ? Smokeless tobacco: Never Used   ? Tobacco comment: at least 10 yrs she smoked   Substance and Sexual Activity   ? Alcohol use: No   ? Drug use: No   ? Sexual activity: None   Other Topics Concern   ? None   Social History Narrative   ? None       REVIEW OF SYSTEM:  Per HPI    PHYSICAL EXAM:   General appearance: alert, appears stated age and cooperative  HEENT: Head is normocephalic with normal hair distribution. No evidence of trauma. Ears: Without lesions or deformity. No acute purulent discharge. [place].  Eyes: Conjunctivae pink with no scleral icterus or erythema. Nose: Normal mucosa and septum. Oropharnyx: mmm, no lesions present.  Lungs: respirations without effort.  Extremities: extremities normal, atraumatic, no cyanosis. Massive amounts of lymphedema present.   Back: kyphosis  Pulses: 2+ and symmetric  Skin: Skin color, texture, turgor normal. No rashes or lesions. Legs wrapped and bandaged but history of blisters present  Neurologic: Grossly normal   Psych: interacts well with caregivers, does NOT exhibits logical thought processes and connections--advanced dementia with delusions, pleasant    LABS:   None today.     ASSESSMENT:      ICD-10-CM    1. Dementia with behavioral disturbance, unspecified dementia type F03.91    2. Delusions (H) F22        PLAN:    Delusions/Agitation/Dementia  -Provide safe and calming environment.   -Redirect patient frequently.   -Provide distracting activities.   -Valproate Level on Monday.   -Depakote 125 mg by mouth in am and in afternoon, 250 mg by mouth at bedtime.   -Psych consult to eval and treat.   -Follow closely.     Otherwise continue current care plan for all other chronic medical conditions. Encouraged patient to engage in healthy lifestyle behaviors such as engaging in social activities, exercising (PT/OT), eating well, and following care plan. Follow up for routine check-up, or sooner if needed. Will continue to monitor patient and work with nursing staff collaboratively to work toward positive patient outcomes.    Electronically signed by: Mayela Yañez CNP

## 2021-06-19 NOTE — LETTER
Letter by Mayela Yañez CNP at      Author: Mayela Yañez CNP Service: -- Author Type: --    Filed:  Encounter Date: 3/15/2019 Status: (Other)         Patient: Li Winston   MR Number: [id number]   YOB: 1935   Date of Visit: 3/15/2019     [facility]    Facility:   [facility] [[id number]]   Code Status: FULL CODE      CHIEF COMPLAINT/REASON FOR VISIT:  Chief Complaint   Patient presents with   ? Problem Visit     falls, delusions, agitation       HISTORY:      HPI: Li is a 83 y.o. female who been living in her own home with failure to thrive, had bad legs at home, not compliant with dressing and support hose, would not always take her medications, especially Lasix because she did not want to have to go to the bathroom so often, she often had wet diapers, she was having frequent falls, developed a pressure ulcer of the sacral region. Her decline was especially notable for the previous month. Li was admitted to the hospital and found to have CK= 381( mild rhabdomyolysis). She was treated with IV fluids, was given ceftriaxone IV for a questionable UA. She received a total of 3 days of IV antibiotics. The culture itself had no growth. Per her son, she has been on suppressive antibiotic (trimethoprim 100 mg every evening) for approximately 9 months per urologist they visited. Li has been transferred to [facility].     Today Li is being evaluated after again falling and continuing to not necessarily be herself. Did speak with family at length to get more insight into the issue. Her son Marc shared that he is worried that she may have issues with temporal arteritis. She has had this issue multiple times in the past, she has been weaning off of prednisone and has not ever tolerated that well in the past. The neurologist she was seeing previously said she may not every be able to come off of prednisone. He did suggest tapering quite slowly, which is occurring  now--however Marc states that may be the issue at hand. She is currently taking 5 mg of Prednisone by mouth daily. Li does not have any other issues to report. Nursing staff share she continues to be off. She is redirectable but does have delusions and confusion that is different than her baseline. Li is alert and oriented to herself. She does not have any new aches or pains, does state that she has had a headaches intermittently her whole life. She does not participate in a full ROS. Vital signs have been stable.     Did discuss further with Marc and nurse manager April, all parties agree that STAT labs will be drawn. If Sed rate or CRP are elevated will go forward with increasing prednisone and managing it as a temporal arteritis flare and reaction to decrease in prednisone. If this is not the case, will evaluate further with admission to hospital for imaging and evaluation of falls, etc. Will continue to attempt prednisone wean after episode, but will do so even slower. Plan to follow with neurology as well. Li also may need an adjustment of depakote for mood stabilization.     Past Medical History:   Diagnosis Date   ? Arthritis    ? Chronic respiratory failure (H)     home O2 therapy   ? Constipation     son states pt. has a little trouble with constipation--takes miralax   ? COPD (chronic obstructive pulmonary disease) (H)    ? Diabetes mellitus (H)     son states thinks she was borderline diabetic 20 yrs ago--just watched her diet at that time   ? Hyperlipidemia    ? Hypertension    ? IBS (irritable bowel syndrome)     not as of 9/21/14   ? Influenza due to influenza virus, type A, human    ? Obesity    ? Osteopenia    ? Temporal arteritis (H)              Family History   Problem Relation Age of Onset   ? Heart disease Father    ? Cancer Sister      Social History     Socioeconomic History   ? Marital status:      Spouse name: None   ? Number of children: None   ? Years of education: None   ?  Highest education level: None   Occupational History   ? None   Social Needs   ? Financial resource strain: None   ? Food insecurity:     Worry: None     Inability: None   ? Transportation needs:     Medical: None     Non-medical: None   Tobacco Use   ? Smoking status: Former Smoker     Packs/day: 0.50     Years: 10.00     Pack years: 5.00     Last attempt to quit: 2000     Years since quittin.1   ? Smokeless tobacco: Never Used   ? Tobacco comment: at least 10 yrs she smoked   Substance and Sexual Activity   ? Alcohol use: No   ? Drug use: No   ? Sexual activity: None   Lifestyle   ? Physical activity:     Days per week: None     Minutes per session: None   ? Stress: None   Relationships   ? Social connections:     Talks on phone: None     Gets together: None     Attends [employer] service: None     Active member of club or organization: None     Attends meetings of clubs or organizations: None     Relationship status: None   ? Intimate partner violence:     Fear of current or ex partner: None     Emotionally abused: None     Physically abused: None     Forced sexual activity: None   Other Topics Concern   ? None   Social History Narrative   ? None       REVIEW OF SYSTEM:  Per HPI    PHYSICAL EXAM:   General appearance: alert, appears stated age and cooperative  HEENT: Head is normocephalic with normal hair distribution. Facial trauma with bruising and hematoma to forehead. Ears: Without lesions or deformity. No acute purulent discharge. [place]. Eyes: Conjunctivae pink with no scleral icterus or erythema. Nose: Normal mucosa and septum. Oropharnyx: mmm, no lesions present.  Lungs: respirations without effort, LS CTA  Cardiovascular: S1, S2, irregular rhythm, regular rate  Abdomen: soft, tender to lower abdomen, +CVAT  Extremities: extremities normal, atraumatic, no cyanosis. Massive amounts of lymphedema present.   Back: kyphosis  Pulses: 2+ and symmetric  Skin: Skin color, texture, turgor normal. No rashes  or lesions. Bruising over face, right shoulder.  Neurologic: Grossly normal   Psych: interacts well with caregivers, does NOT exhibits logical thought processes and connections--advanced dementia with delusions, pleasant    LABS:   CBC, CMP, Procalcitonin, UA/UC.     ASSESSMENT:      ICD-10-CM    1. Temporal arteritis (H) M31.6    2. Delusions (H) F22    3. Falls frequently R29.6        PLAN:    Fall  -Please place on fall precautions and monitor patient routinely.  -Remove foot pedals from wheelchair per son's request.   -Encouraged patient to ask for help with all transfers.   -Continue to ice sore areas.   -Tylenol 500 mg by mouth every 6 hours as needed for pain.  -Continue to assess for developing injuries and if patient reports any pain request provider follow-up.    Delusions/Temporal Arteritis  -CRP, Sed Rate, CBC, CMP STAT.   -If CRP/Sed Rate elevated increase Prednisone to 20 mg by mouth daily.   -Follow closely.     Otherwise continue current care plan for all other chronic medical conditions. Encouraged patient to engage in healthy lifestyle behaviors such as engaging in social activities, exercising (PT/OT), eating well, and following care plan. Follow up for routine check-up, or sooner if needed. Will continue to monitor patient and work with nursing staff collaboratively to work toward positive patient outcomes.    Total unit/floor time of 45 minutes time consisted of the following: time spent with patient, examination of patient, reviewing the record including pertinent labs and completing documentation. Coordination of care time with nursing staff and other healthcare providers 25 minutes, this time was spent on discussion/counseling on current care plan including medical management of chronic health problems and acute health problems, education pertaining to plan, and discussion of the goals of care pertaining to the current outlined plan with nursing staff, son Marc and patient.    Electronically  signed by: Mayela Yañez, CNP

## 2021-06-21 NOTE — PROGRESS NOTES
[facility]    Facility:   [facility] [[id number]]   Code Status: FULL CODE      CHIEF COMPLAINT/REASON FOR VISIT:  Chief Complaint   Patient presents with     Review Of Multiple Medical Conditions     physical deconditioning, falls, failure to thrive       HISTORY:      HPI: Li is a 83 y.o. female who been living in her own home with failure to thrive, had bad legs at home, not compliant with dressing and support hose, would not always take her medications, especially Lasix because she did not want to have to go to the bathroom so often, she often had wet diapers, she was having frequent falls, developed a pressure ulcer of the sacral region. Her decline was especially notable for the previous month. Li was admitted to the hospital and found to have CK= 381( mild rhabdomyolysis). She was treated with IV fluids, was given ceftriaxone IV for a questionable UA. She received a total of 3 days of IV antibiotics. The culture itself had no growth. Per her son, she has been on suppressive antibiotic (trimethoprim 100 mg every evening) for approximately 9 months per urologist they visited.    Today she is being evaluated for a routine review of multiple medical problems while in the TCU. Li has been doing well she reports. She does want to go home but understands that she will be moving to a LTC facility in order to be safe. Li states she has been relatively well. Nursing staff share she has been ok, with minimal behaviors. They do not have any issues to discuss regarding Li. Li denies any other concerns including fevers/chills, cough or cold symptoms, headaches, vision changes, chest pain/pressure, difficulty breathing, SOB, abdominal pain, nausea, vomiting, diarrhea, dysuria, increasing weakness, increasing pain.     Past Medical History:   Diagnosis Date     Arthritis      Chronic respiratory failure (H)     home O2 therapy     Constipation     son states pt. has a little  trouble with constipation--takes miralax     COPD (chronic obstructive pulmonary disease) (H)      Diabetes mellitus (H)     son states thinks she was borderline diabetic 20 yrs ago--just watched her diet at that time     Hyperlipidemia      Hypertension      IBS (irritable bowel syndrome)     not as of 9/21/14     Influenza due to influenza virus, type A, human      Obesity      Osteopenia      Temporal arteritis (H)              Family History   Problem Relation Age of Onset     Heart disease Father      Cancer Sister      Social History     Social History     Marital status:      Spouse name: N/A     Number of children: N/A     Years of education: N/A     Social History Main Topics     Smoking status: Former Smoker     Packs/day: 0.50     Years: 10.00     Quit date: 1/21/2000     Smokeless tobacco: Never Used      Comment: at least 10 yrs she smoked     Alcohol use No     Drug use: No     Sexual activity: Not on file     Other Topics Concern     Not on file     Social History Narrative       REVIEW OF SYSTEM:  Per HPI    PHYSICAL EXAM:   General appearance: alert, appears stated age and cooperative  HEENT: Head is normocephalic with normal hair distribution. No evidence of trauma. Ears: Without lesions or deformity. No acute purulent discharge. [place]. Eyes: Conjunctivae pink with no scleral icterus or erythema. Nose: Normal mucosa and septum. Oropharnyx: mmm, no lesions present.  Lungs: clear to auscultation bilaterally, respirations without effort  Heart: regular rate and rhythm, S1, S2 normal, no murmur, click, rub or gallop  Abdomen: soft, non-tender; bowel sounds normal; no masses,  no organomegaly  Extremities: extremities normal, atraumatic, no cyanosis. Massive amounts of lymphedema present.   Back: kyphosis  Pulses: 2+ and symmetric  Skin: Skin color, texture, turgor normal. No rashes or lesions. Legs wrapped and bandaged but history of blisters present  Neurologic: Grossly normal   Psych: interacts  well with caregivers, exhibits logical thought processes and connections, pleasant    LABS:   None today.     ASSESSMENT:      ICD-10-CM    1. Physical deconditioning R53.81    2. Adult failure to thrive R62.7    3. Falls frequently R29.6        PLAN:    Physical Deconditioning  -Continue PT/OT and other therapies as per care plan.  -Encouraged good nutrition and movement habits.   -Discussed care plan and expected course of stay.   -Continue to follow-up per routine schedule or sooner if needed.     Adult Failure to Thrive  -PT/OT.   -Dietician to eval for nutritional support.     Falls  -Place on fall precautions with 2 hour rounding.     Otherwise continue current care plan for all other chronic medical conditions, as they are stable. Encouraged patient to engage in healthy lifestyle behaviors such as engaging in social activities, exercising (PT/OT), eating well, and following care plan. Follow up for routine check-up, or sooner if needed. Will continue to monitor patient and work with nursing staff collaboratively to work toward positive patient outcomes.    Electronically signed by: Mayela Yañez CNP

## 2021-06-21 NOTE — PROGRESS NOTES
[facility]      Facility:    [facility] [[id number]]    Code Status: FULL CODE ( Had been DNR in hospital, son stated she wanted it full code)  [facility]    10/15 through 10/18/18  [facility]    11/2 through 11/6/18        Chief Complaint/Reason for Visit:  Chief Complaint   Patient presents with     H & P     readmission after UTI and psychotic behavior       HPI:   Li is a 83 y.o. female who been living in her own home with failure to thrive, had bad legs at home, not compliant with dressing and support hose, would not always take her medications, especially Lasix because she did not want to have to go to the bathroom so often, she often had wet diapers, she was having frequent falls, developed a pressure ulcer of the sacral region.  Her decline was especially notable for the previous month.  She was admitted to the hospital and found to have CK= 381( mild rhabdomyolysis).  She was treated with IV fluids, was given ceftriaxone IV for a questionable UA.  She received a total of 3 days of IV antibiotics.  The culture itself had no growth.  Per her son, she has been on suppressive antibiotic (trimethoprim 100 mg every evening) for approximately 9 months per urologist they visited.    Her son said she really was not a candidate to go home to live alone.  They are searching for long-term care/assisted living facility.    Other medical issues:  History of COPD  Hypertension  Temporal arteritis on low-dose steroids for many years (approximately 15)  Dyslipidemia.  Osteopenia  Obesity  History of temporal arteritis still on steroids    She transferred to [facility]: She is been having sundowners symptoms with agitation, confusion, refusing cares.  According to her son Marc who checks on her the most of her 3 sons, this is always when she is getting a bladder infection.  He is also worried that after finishing the ceftriaxone in the hospital times 3 days,  "that she was not put back on her trimethoprim, and he fears that now she has an infection \"again\"  UA was checked was negative  Her trimethoprim prophylactic was restarted    In spite of this, her behavior deteriorated, she had some temperature.  She was having hallucinations of people that were not present, having conversations, getting irate, refusing cares and medications.  She was sent into [facility], she did ultimately have an E. coli acute cystitis.  Psychiatry was consulted, and she was put on Seroquel.  They felt she had an underlying cognitive disorder because of her sundowning, made worse by her hearing loss and bladder infection.  They felt she might benefit from an antidepressant once returning to [facility].  She continues to be a behavioral problem now that she is transferred back to [facility].        Past Medical History:  Past Medical History:   Diagnosis Date     Arthritis      Chronic respiratory failure (H)     home O2 therapy     Constipation     son states pt. has a little trouble with constipation--takes miralax     COPD (chronic obstructive pulmonary disease) (H)      Diabetes mellitus (H)     son states thinks she was borderline diabetic 20 yrs ago--just watched her diet at that time     Hyperlipidemia      Hypertension      IBS (irritable bowel syndrome)     not as of 9/21/14     Influenza due to influenza virus, type A, human      Obesity      Osteopenia      Temporal arteritis (H)            Surgical History:  Past Surgical History:   Procedure Laterality Date     CATARACT EXTRACTION Bilateral      TEMPORAL ARTERY BIOPSY / LIGATION         Family History:   Family History   Problem Relation Age of Onset     Heart disease Father      Cancer Sister        Social History:    Social History     Social History     Marital status:      Spouse name: N/A     Number of children: N/A     Years of education: N/A     Social History Main Topics     Smoking status: Former Smoker     " Packs/day: 0.50     Years: 10.00     Quit date: 1/21/2000     Smokeless tobacco: Never Used      Comment: at least 10 yrs she smoked     Alcohol use No     Drug use: No     Sexual activity: Not on file            Review of Systems   She has no recollection of her behaviors, was very aggressive and yelling over the first 2 days back here from the hospital.  Discussed with nurse manager: request increase in Seroquel.  Pt denies all issues on comprehensive ROS, not deemed reliable    Vitals:    11/12/18 0900   BP: 113/66   Pulse: 60   Resp: 20   Temp: 97.9  F (36.6  C)   SpO2: 97%       Physical Exam    Constitutional: Elderly  female, obese, hard of hearing but answers all questions   Left lower eyelid cyst. The remainder of the HEENT is normal  Cardiovascular: Regular rhythm and normal heart sounds. No murmur.   Pulmonary/Chest: Breath sounds normal.  She has no rales.   Abdominal: Soft. Bowel sounds are normal. She exhibits no distension. There is no tenderness.   Musculoskeletal: Assisted her to bathroom: pivoted onto and out of wheelchair w CG; needed help with clothing management  Limited shoulder range of motion  Hand grasp 4/5, thigh flexors extensors 3/5   Lymphadenopathy:     She has no cervical adenopathy.   Neurological: She is alert. She exhibits normal muscle tone.   Clearly short-term memory deficits  No asymmetry   Skin: Skin is warm.   Unable to see her sacral area, obese, sitting in a wheelchair  Marked Lymphedema of both lower extremities, dark pink coloration  Minor blistering subcutaneously  Right fibular area of the shin with a 1 cm diameter superficial de-mallika  Psychiatric: She presents a normal mood and affect. But , other times yelling out, refusing cares  and medications. Still having hallucinations even though her cystitis was treated      Allergies   Allergen Reactions     Lorazepam Other (See Comments)     Confusion and it didn't work.     Other Environmental Allergy       Allergies to strong scents - soap, perfume     Statins-Hmg-Coa Reductase Inhibitors Myalgia     With elevated CK     [place]      Hydrocodone Rash     Prevacid [Lansoprazole]      Reaction: Stomach upset       Medication List:  Current Outpatient Medications   Medication Sig     albuterol (PROVENTIL HFA;VENTOLIN HFA) 90 mcg/actuation inhaler Inhale 2 puffs every 4 (four) hours as needed for wheezing.     aspirin 81 mg chewable tablet Chew 81 mg Daily at 8:00 am..     furosemide (LASIX) 20 MG tablet Take by mouth see administration instructions. 40 mg in the morning and 20 mg in the afternoon.     ibuprofen (ADVIL,MOTRIN) 200 MG tablet Take 200 mg by mouth every 8 (eight) hours as needed for pain. OTC      metoprolol (TOPROL-XL) 50 MG 24 hr tablet Take 50 mg by mouth daily.     OXYGEN-AIR DELIVERY SYSTEMS MISC 2 L into each nostril At night PRN (SOB).     pediatric multivitamin (CHILDREN'S MULTIVIT W/EXTRA C) Chew chewable tablet Chew 1 tablet daily.     PREDNISONE ORAL Take 9 mg by mouth daily with breakfast.      QUEtiapine (SEROQUEL) 50 MG tablet Take 1 tablet (50 mg total) by mouth at bedtime. (Patient taking differently: Take 50 mg by mouth 4 (four) times a day .      )     ramelteon (ROZEREM) 8 mg tablet Take 1 tablet (8 mg total) by mouth at bedtime as needed for sleep.     senna (SENOKOT) 8.6 mg tablet Take 2 tablets by mouth 2 (two) times a day as needed for constipation.     trimethoprim (TRIMPEX) 100 mg tablet Take 100 mg by mouth every evening.       Labs:   Ref Range & Units 11/6/18 0729   Sodium 136 - 145 mmol/L 140    Potassium 3.5 - 5.0 mmol/L 4.3    Chloride 98 - 107 mmol/L 101    CO2 22 - 31 mmol/L 28    Anion Gap, Calculation 5 - 18 mmol/L 11    Glucose 70 - 125 mg/dL 87    Calcium 8.5 - 10.5 mg/dL 8.7    BUN 8 - 28 mg/dL 23    Creatinine 0.60 - 1.10 mg/dL 1.16 Abnormally high     GFR MDRD Non Af Amer >60 mL/min/1.73m2 45 Abnormally low         Ref Range & Units 11/6/18    WBC 4.0 - 11.0 thou/uL  10.3    Hemoglobin 12.0 - 16.0 g/dL 10.1 Abnormally low     RDW 11.0 - 14.5 % 15.2 Abnormally high     Platelets 140 - 440 thou/uL 273       Ref Range & Units 11/6/18    Magnesium 1.8 - 2.6 mg/dL 2.3         Culture URINE  No Growth   Specimen Collected: 10/15/18  4:35 PM Last Resulted: 10/16/18                Ref Range & Units 10/15/18     WBC 4.0 - 11.0 thou/uL 10.0   Hemoglobin 12.0 - 16.0 g/dL 10.2 (L)   RDW 11.0 - 14.5 % 14.8 (H)   Platelets 140 - 440 thou/uL 280   Neutrophils % 50 - 70 % 80 (H)   Lymphocytes % 20 - 40 % 12 (L)        Ref Range & Units 10/15/18  5:18 PM     Sodium 136 - 145 mmol/L 136   Potassium 3.5 - 5.0 mmol/L 3.1 (L)   Chloride 98 - 107 mmol/L 91 (L)   CO2 22 - 31 mmol/L 31   Anion Gap, Calculation 5 - 18 mmol/L 14   Glucose 70 - 125 mg/dL 97   BUN 8 - 28 mg/dL 18   Creatinine 0.60 - 1.10 mg/dL 1.21 (H)   GFR MDRD Non Af Amer >60 mL/min/1.73m2 43 (L)   Bilirubin, Total 0.0 - 1.0 mg/dL 1.5 (H)   Calcium 8.5 - 10.5 mg/dL 8.8   Protein, Total 6.0 - 8.0 g/dL 6.2   Albumin 3.5 - 5.0 g/dL 2.7 (L)   Alkaline Phosphatase 45 - 120 U/L 70   AST 0 - 40 U/L 29   ALT 0 - 45 U/L 16     Lipase, magnesium = normal      Assessment / Plan:    ICD-10-CM        ICD-10-CM    1. Delirium R41.0 Increase Seroquel to 50 four times a day.   2. Cystitis N30.90 Trimethoprim prophylactically   3. Falls frequently R29.6    4. Weakness R53.1    5. Chronic obstructive pulmonary disease, unspecified COPD type (H) J44.9 Albuterol   6. Temporal arteritis (H) M31.6    7. Essential hypertension I10 Metoprolol, lasix         Electronically signed by: Urmila Luong MD

## 2021-06-21 NOTE — PROGRESS NOTES
[facility]    Facility:   [place] [facility] [[id number]]   Code Status: FULL CODE      CHIEF COMPLAINT/REASON FOR VISIT:  Chief Complaint   Patient presents with     Problem Visit     Delirium, Dementia, Cognitive and Behavioral Changes       HISTORY:      HPI: Li is a 83 y.o. female who been living in her own home with failure to thrive, had bad legs at home, not compliant with dressing and support hose, would not always take her medications, especially Lasix because she did not want to have to go to the bathroom so often, she often had wet diapers, she was having frequent falls, developed a pressure ulcer of the sacral region. Her decline was especially notable for the previous month. Li was admitted to the hospital and found to have CK= 381( mild rhabdomyolysis). She was treated with IV fluids, was given ceftriaxone IV for a questionable UA. She received a total of 3 days of IV antibiotics. The culture itself had no growth. Per her son, she has been on suppressive antibiotic (trimethoprim 100 mg every evening) for approximately 9 months per urologist they visited. Li has been transferred to LT.     Today she is being evaluated for a follow-up after initiating depakote for behaviors. Li has been doing quite well per nursing report. She has been appropriate for the most part with only a few minor outbursts. Li states she is feeling fine. She is still confused and delusional. She does appear to be in a much better mood and did allow for the staff to bathe her. Li does not have any medical problems she would like to discuss today. Does not realize she is on any new medications, did ask her how that was going and did ask about side effects and she reports she is doing well. Did discuss with nursing staff and they report they would like to try an increase in depakote, will notify sons. Li denies any concerns including fevers/chills, cough or cold symptoms, headaches, vision  changes, chest pain/pressure, difficulty breathing, SOB, abdominal pain, nausea, vomiting, diarrhea, dysuria, increasing weakness, increasing pain.     Past Medical History:   Diagnosis Date     Arthritis      Chronic respiratory failure (H)     home O2 therapy     Constipation     son states pt. has a little trouble with constipation--takes miralax     COPD (chronic obstructive pulmonary disease) (H)      Diabetes mellitus (H)     son states thinks she was borderline diabetic 20 yrs ago--just watched her diet at that time     Hyperlipidemia      Hypertension      IBS (irritable bowel syndrome)     not as of 14     Influenza due to influenza virus, type A, human      Obesity      Osteopenia      Temporal arteritis (H)              Family History   Problem Relation Age of Onset     Heart disease Father      Cancer Sister      Social History     Socioeconomic History     Marital status:      Spouse name: Not on file     Number of children: Not on file     Years of education: Not on file     Highest education level: Not on file   Social Needs     Financial resource strain: Not on file     Food insecurity - worry: Not on file     Food insecurity - inability: Not on file     Transportation needs - medical: Not on file     Transportation needs - non-medical: Not on file   Occupational History     Not on file   Tobacco Use     Smoking status: Former Smoker     Packs/day: 0.50     Years: 10.00     Pack years: 5.00     Last attempt to quit: 2000     Years since quittin.8     Smokeless tobacco: Never Used     Tobacco comment: at least 10 yrs she smoked   Substance and Sexual Activity     Alcohol use: No     Drug use: No     Sexual activity: Not on file   Other Topics Concern     Not on file   Social History Narrative     Not on file       REVIEW OF SYSTEM:  Per HPI    PHYSICAL EXAM:   General appearance: alert, appears stated age and cooperative  HEENT: Head is normocephalic with normal hair  distribution. No evidence of trauma. Ears: Without lesions or deformity. No acute purulent discharge. [place]. Eyes: Conjunctivae pink with no scleral icterus or erythema. Nose: Normal mucosa and septum. Oropharnyx: mmm, no lesions present.  Lungs: respirations without effort.  Extremities: extremities normal, atraumatic, no cyanosis. Massive amounts of lymphedema present.   Back: kyphosis  Pulses: 2+ and symmetric  Skin: Skin color, texture, turgor normal. No rashes or lesions. Legs wrapped and bandaged but history of blisters present  Neurologic: Grossly normal   Psych: interacts well with caregivers, exhibits logical thought processes and connections, pleasant    LABS:   None today.     ASSESSMENT:      ICD-10-CM    1. Delirium R41.0    2. Cognitive and behavioral changes R41.89     R46.89    3. Dementia with behavioral disturbance, unspecified dementia type F03.91        PLAN:    Delirium/Agitation/Dementia  -Provide safe and calming environment.   -Redirect patient frequently.   -Provide distracting activities.   -Valproate Level on MOnday.   -Depakote 250 mg by mouth twice daily.   -Psych consult to eval and treat.   -Follow closely.     Otherwise continue current care plan for all other chronic medical conditions. Encouraged patient to engage in healthy lifestyle behaviors such as engaging in social activities, exercising (PT/OT), eating well, and following care plan. Follow up for routine check-up, or sooner if needed. Will continue to monitor patient and work with nursing staff collaboratively to work toward positive patient outcomes.    Electronically signed by: Mayela Yañez CNP

## 2021-06-21 NOTE — PROGRESS NOTES
[facility]    Facility:   [place] [facility] [[id number]]   Code Status: FULL CODE      CHIEF COMPLAINT/REASON FOR VISIT:  Chief Complaint   Patient presents with     Review Of Multiple Medical Conditions     physical deconditioning, falls frequently, adult failure to thrive, behavioral changes       HISTORY:      HPI: Li is a 83 y.o. female who been living in her own home with failure to thrive, had bad legs at home, not compliant with dressing and support hose, would not always take her medications, especially Lasix because she did not want to have to go to the bathroom so often, she often had wet diapers, she was having frequent falls, developed a pressure ulcer of the sacral region. Her decline was especially notable for the previous month. Li was admitted to the hospital and found to have CK= 381( mild rhabdomyolysis). She was treated with IV fluids, was given ceftriaxone IV for a questionable UA. She received a total of 3 days of IV antibiotics. The culture itself had no growth. Per her son, she has been on suppressive antibiotic (trimethoprim 100 mg every evening) for approximately 9 months per urologist they visited.    Today she is being evaluated for a routine review of multiple medical problems while in the TCU. Li does not have any issues to report. She states she has been doing fine. She is feeling well and does not have any medical concerns. Her legs remain very swollen. Her behaviors have improved significantly per nursing staff and nurse . Li denies any concerns including fevers/chills, cough or cold symptoms, headaches, vision changes, chest pain/pressure, difficulty breathing, SOB, abdominal pain, nausea, vomiting, diarrhea, dysuria, increasing weakness, increasing pain.     Past Medical History:   Diagnosis Date     Arthritis      Chronic respiratory failure (H)     home O2 therapy     Constipation     son states pt. has a little trouble with  constipation--takes miralax     COPD (chronic obstructive pulmonary disease) (H)      Diabetes mellitus (H)     son states thinks she was borderline diabetic 20 yrs ago--just watched her diet at that time     Hyperlipidemia      Hypertension      IBS (irritable bowel syndrome)     not as of 14     Influenza due to influenza virus, type A, human      Obesity      Osteopenia      Temporal arteritis (H)              Family History   Problem Relation Age of Onset     Heart disease Father      Cancer Sister      Social History     Socioeconomic History     Marital status:      Spouse name: Not on file     Number of children: Not on file     Years of education: Not on file     Highest education level: Not on file   Social Needs     Financial resource strain: Not on file     Food insecurity - worry: Not on file     Food insecurity - inability: Not on file     Transportation needs - medical: Not on file     Transportation needs - non-medical: Not on file   Occupational History     Not on file   Tobacco Use     Smoking status: Former Smoker     Packs/day: 0.50     Years: 10.00     Pack years: 5.00     Last attempt to quit: 2000     Years since quittin.8     Smokeless tobacco: Never Used     Tobacco comment: at least 10 yrs she smoked   Substance and Sexual Activity     Alcohol use: No     Drug use: No     Sexual activity: Not on file   Other Topics Concern     Not on file   Social History Narrative     Not on file       REVIEW OF SYSTEM:  Per HPI    PHYSICAL EXAM:   General appearance: alert, appears stated age and cooperative  HEENT: Head is normocephalic with normal hair distribution. No evidence of trauma. Ears: Without lesions or deformity. No acute purulent discharge. [place]. Eyes: Conjunctivae pink with no scleral icterus or erythema. Nose: Normal mucosa and septum. Oropharnyx: mmm, no lesions present.  Lungs: crackles at bases, respirations without effort  Heart: regular rate and rhythm, S1, S2  normal, no murmur, click, rub or gallop  Abdomen: soft, non-tender; bowel sounds normal; no masses,  no organomegaly  Extremities: extremities normal, atraumatic, no cyanosis. Massive amounts of lymphedema present.   Back: kyphosis  Pulses: 2+ and symmetric  Skin: Skin color, texture, turgor normal. No rashes or lesions. Legs wrapped and bandaged but history of blisters present  Neurologic: Grossly normal   Psych: interacts well with caregivers, exhibits logical thought processes and connections, pleasant    LABS:   None today.     ASSESSMENT:      ICD-10-CM    1. Physical deconditioning R53.81    2. Falls frequently R29.6    3. Cognitive and behavioral changes R41.89     R46.89    4. Adult failure to thrive R62.7    5. Lymphedema I89.0        PLAN:    Physical Deconditioning  -Continue PT/OT and other therapies as per care plan.  -Encouraged good nutrition and movement habits.   -Discussed care plan and expected course of stay.   -Continue to follow-up per routine schedule or sooner if needed.     Adult Failure to Thrive  -PT/OT.   -Dietician to eval for nutritional support.     Falls  -Place on fall precautions with 2 hour rounding.     Lymphedema  -Ace wrap to legs daily.   -Elevate legs as much as possible.   -Furosemide dose adjusted.     Otherwise continue current care plan for all other chronic medical conditions, as they are stable. Encouraged patient to engage in healthy lifestyle behaviors such as engaging in social activities, exercising (PT/OT), eating well, and following care plan. Follow up for routine check-up, or sooner if needed. Will continue to monitor patient and work with nursing staff collaboratively to work toward positive patient outcomes.    Electronically signed by: Mayela Yañez CNP

## 2021-06-21 NOTE — PROGRESS NOTES
[facility]      Facility:    [facility] [[id number]]    Code Status: FULL CODE ( Had been DNR in hospital, son stated she wanted it full code)  [facility] 10/15 through 10/18/18      Chief Complaint/Reason for Visit:  Chief Complaint   Patient presents with     H & P     UTI / confusion       HPI:   Li is a 83 y.o. female who been living in her own home with failure to thrive, had bad legs at home, not compliant with dressing and support hose, would not always take her medications, especially Lasix because she did not want to have to go to the bathroom so often, she often had wet diapers, she was having frequent falls, developed a pressure ulcer of the sacral region.  Her decline was especially notable for the previous month.  She was admitted to the hospital and found to have CK= 381( mild rhabdomyolysis).  She was treated with IV fluids, was given ceftriaxone IV for a questionable UA.  She received a total of 3 days of IV antibiotics.  The culture itself had no growth.  Per her son, she has been on suppressive antibiotic (trimethoprim 100 mg every evening) for approximately 9 months per urologist they visited.    Her son said she really was not a candidate to go home to live alone.  They are searching for long-term care/assisted living facility.    Other medical issues:  History of COPD  Hypertension  Temporal arteritis on low-dose steroids for many years (approximately 15)  Dyslipidemia.  Osteopenia  Obesity  History of temporal arteritis still on steroids    [facility] She is been having sundowners symptoms with agitation, confusion, refusing cares.  According to her son Marc who checks on her the most of her 3 sons, this is always when she is getting a bladder infection.  He is also worried that after finishing the ceftriaxone in the hospital times 3 days, that she was not put back on her trimethoprim, and he fears that now she has an  "infection \"again\"        Past Medical History:  Past Medical History:   Diagnosis Date     Arthritis      Chronic respiratory failure (H)     home O2 therapy     Constipation     son states pt. has a little trouble with constipation--takes miralax     COPD (chronic obstructive pulmonary disease) (H)      Diabetes mellitus (H)     son states thinks she was borderline diabetic 20 yrs ago--just watched her diet at that time     Hyperlipidemia      Hypertension      IBS (irritable bowel syndrome)     not as of 9/21/14     Influenza due to influenza virus, type A, human      Obesity      Osteopenia      Temporal arteritis (H)            Surgical History:  Past Surgical History:   Procedure Laterality Date     CATARACT EXTRACTION Bilateral      TEMPORAL ARTERY BIOPSY / LIGATION         Family History:   Family History   Problem Relation Age of Onset     Heart disease Father      Cancer Sister        Social History:    Social History     Social History     Marital status:      Spouse name: N/A     Number of children: N/A     Years of education: N/A     Social History Main Topics     Smoking status: Former Smoker     Packs/day: 0.50     Years: 10.00     Quit date: 1/21/2000     Smokeless tobacco: Never Used      Comment: at least 10 yrs she smoked     Alcohol use No     Drug use: No     Sexual activity: Not on file            Review of Systems   She has no awareness of her behaviors in the evening  Per her son she is very hard of hearing, uses voice augmenter and headphones  As above under HPI, she is not a candidate for going to home, has had failure to thrive  She has had huge edema in her legs with blistering intermittently  According to the son's recollection, she only had the initial biopsy of her temporal artery  I had talked to her son carlin, about using Depakote.  He said he believes she had a very bad reaction to that before.  He does not think they have ever tried Seroquel, if her urinalysis urine culture " again is negative, he agrees to give that a try of her sundowning related behavior continues.  Li denies constipation, problems with urination, or pain.    Vitals:    10/22/18 0900   BP: 119/62   Pulse: 68   Resp: 20   Temp: 97.8  F (36.6  C)   SpO2: 98%       Physical Exam   Constitutional: No distress.   Elderly  female, obese, hard of hearing but answers all questions   HENT:   Nose: Nose normal.   Mouth/Throat: Oropharynx is clear and moist.   Hard of hearing with voice augment   Eyes: EOM are normal. No scleral icterus.   L conjunctiva   Cardiovascular: Regular rhythm and normal heart sounds.    No murmur heard.  Pulmonary/Chest: Breath sounds normal. No respiratory distress. She has no wheezes. She has no rales.   Abdominal: Soft. Bowel sounds are normal. She exhibits no distension. There is no tenderness.   Musculoskeletal:   Limited shoulder range of motion  Hand grasp 4/5, thigh flexors extensors 3/5   Lymphadenopathy:     She has no cervical adenopathy.   Neurological: She is alert. She exhibits normal muscle tone.   Clearly short-term memory deficits  No asymmetry   Skin: Skin is warm.   Unable to see her sacral area, obese, sitting in a wheelchair  Lymphedema of both lower extremities,  Minor blistering subcutaneously  Right fibular area of the shin with a 1 cm diameter superficial de-mallika  Cyst of the left lower eyelid nasally   Psychiatric: She has a normal mood and affect.   During exam affect normal, other times yelling out, refusing cares       Medication List:  Current Outpatient Prescriptions   Medication Sig     albuterol (PROVENTIL HFA;VENTOLIN HFA) 90 mcg/actuation inhaler Inhale 2 puffs every 4 (four) hours as needed for wheezing.     aspirin 81 mg chewable tablet Chew 81 mg Daily at 8:00 am..     furosemide (LASIX) 20 MG tablet Take 20-40 mg by mouth see administration instructions. 40 mg in the morning and 20 mg in the afternoon.     ibuprofen (ADVIL,MOTRIN) 200 MG tablet Take  200 mg by mouth every 8 (eight) hours as needed for pain. OTC      metoprolol (TOPROL-XL) 50 MG 24 hr tablet Take 50 mg by mouth daily.     OXYGEN-AIR DELIVERY SYSTEMS MISC 2 L into each nostril At night PRN (SOB).     pediatric multivitamin (CHILDREN'S MULTIVIT W/EXTRA C) Chew chewable tablet Chew 1 tablet daily.     PREDNISONE ORAL Take 9 mg by mouth daily with breakfast.        Labs:    Culture URINE  No Growth   Specimen Collected: 10/15/18  4:35 PM Last Resulted: 10/16/18                Ref Range & Units 10/15/18     WBC 4.0 - 11.0 thou/uL 10.0   Hemoglobin 12.0 - 16.0 g/dL 10.2 (L)   RDW 11.0 - 14.5 % 14.8 (H)   Platelets 140 - 440 thou/uL 280   Neutrophils % 50 - 70 % 80 (H)   Lymphocytes % 20 - 40 % 12 (L)        Ref Range & Units 10/15/18  5:18 PM     Sodium 136 - 145 mmol/L 136   Potassium 3.5 - 5.0 mmol/L 3.1 (L)   Chloride 98 - 107 mmol/L 91 (L)   CO2 22 - 31 mmol/L 31   Anion Gap, Calculation 5 - 18 mmol/L 14   Glucose 70 - 125 mg/dL 97   BUN 8 - 28 mg/dL 18   Creatinine 0.60 - 1.10 mg/dL 1.21 (H)   GFR MDRD Non Af Amer >60 mL/min/1.73m2 43 (L)   Bilirubin, Total 0.0 - 1.0 mg/dL 1.5 (H)   Calcium 8.5 - 10.5 mg/dL 8.8   Protein, Total 6.0 - 8.0 g/dL 6.2   Albumin 3.5 - 5.0 g/dL 2.7 (L)   Alkaline Phosphatase 45 - 120 U/L 70   AST 0 - 40 U/L 29   ALT 0 - 45 U/L 16     Lipase, magnesium = normal      Assessment / Plan:    ICD-10-CM    1. Adult failure to thrive R62.7  needs custodial for long-term care   2. Weakness R53.1  did ambulate with PT needs to have    3. Falls frequently R29.6  mild rhabdomyolysis that resolved with IV fluids   4. Chronic venous insufficiency I87.2  needs compression wraps only covered with gauze at this time.  Son Marc was unable to find T no shape as it would fit her large diameter   5. Chronic obstructive pulmonary disease, unspecified COPD type (H) J44.9  butyryl as needed   6. Temporal arteritis (H) M31.6  needing of the steroids has begun, imagine 1 mg/month might be an safe rate    7 HTN I10  metoprolol   8. Hypokalemia E87.6        Total time 50 minutes, greater than 50% in discussion with the son first in the phone, and later when he visited his mother about her confusion, his thought that her behaviors her from a bladder infection where she did not have one in the hospital, and for donating the above care plan      Electronically signed by: Urmila Luong MD

## 2021-06-21 NOTE — PROGRESS NOTES
[facility]    Facility:   [place] [facility] [[id number]]   Code Status: FULL CODE      CHIEF COMPLAINT/REASON FOR VISIT:  Chief Complaint   Patient presents with     Problem Visit     Behaviors       HISTORY:      HPI: Li is a 83 y.o. female who been living in her own home with failure to thrive, had bad legs at home, not compliant with dressing and support hose, would not always take her medications, especially Lasix because she did not want to have to go to the bathroom so often, she often had wet diapers, she was having frequent falls, developed a pressure ulcer of the sacral region. Her decline was especially notable for the previous month. Li was admitted to the hospital and found to have CK= 381( mild rhabdomyolysis). She was treated with IV fluids, was given ceftriaxone IV for a questionable UA. She received a total of 3 days of IV antibiotics. The culture itself had no growth. Per her son, she has been on suppressive antibiotic (trimethoprim 100 mg every evening) for approximately 9 months per urologist they visited. Li has been transferred to LTC.     Today she is being evaluated by request of nursing staff due to delirium and behaviors. Li has been shouting and hollering at staff and residents for several days. However, today she had an episode where she stood up in the dining room and teetered around, as nursing staff attempted to help her and prevent a fall Li started screaming that she had a gun and that she was going to shoot everyone. Li was so agitated nobody could do anything with her. She finally settled down after taking Seroquel 50 mg. It is getting increasingly difficult for the nursing home to care for Li when she has her outbursts. Did call Li's son Marc and he agrees that the best thing is to admit her to a juanita-psych unit. A bed was obtained at [facility]. When [facility] called Marc to obtain consent they got scared of  potential side effects and nursing staff did agree to keep Li with attempting a trial of Depakote. Li did have Depakote before and seemed to be somewhat sleepy from it per Marc, but he would like to move forward with it. Did discuss risks/benefits. Li was not understanding and did discuss that she was marrying her lover who also happens to be a world famous doctor. Li had a flight of ideas and thoughts were tangential. Li was reoriented several times but did not respond well to reorientation. Li was very upset and wanted writer to stay with her for several days to ensure she was safe. Did reassure Li she was safe. Li denies any concerns including fevers/chills, cough or cold symptoms, headaches, vision changes, chest pain/pressure, difficulty breathing, SOB, abdominal pain, nausea, vomiting, diarrhea, dysuria, increasing weakness, increasing pain.     Past Medical History:   Diagnosis Date     Arthritis      Chronic respiratory failure (H)     home O2 therapy     Constipation     son states pt. has a little trouble with constipation--takes miralax     COPD (chronic obstructive pulmonary disease) (H)      Diabetes mellitus (H)     son states thinks she was borderline diabetic 20 yrs ago--just watched her diet at that time     Hyperlipidemia      Hypertension      IBS (irritable bowel syndrome)     not as of 9/21/14     Influenza due to influenza virus, type A, human      Obesity      Osteopenia      Temporal arteritis (H)              Family History   Problem Relation Age of Onset     Heart disease Father      Cancer Sister      Social History     Socioeconomic History     Marital status:      Spouse name: Not on file     Number of children: Not on file     Years of education: Not on file     Highest education level: Not on file   Social Needs     Financial resource strain: Not on file     Food insecurity - worry: Not on file     Food insecurity - inability: Not on file     Transportation needs  - medical: Not on file     Transportation needs - non-medical: Not on file   Occupational History     Not on file   Tobacco Use     Smoking status: Former Smoker     Packs/day: 0.50     Years: 10.00     Pack years: 5.00     Last attempt to quit: 2000     Years since quittin.8     Smokeless tobacco: Never Used     Tobacco comment: at least 10 yrs she smoked   Substance and Sexual Activity     Alcohol use: No     Drug use: No     Sexual activity: Not on file   Other Topics Concern     Not on file   Social History Narrative     Not on file       REVIEW OF SYSTEM:  Per HPI    PHYSICAL EXAM:   General appearance: alert, appears stated age and cooperative  HEENT: Head is normocephalic with normal hair distribution. No evidence of trauma. Ears: Without lesions or deformity. No acute purulent discharge. [place]. Eyes: Conjunctivae pink with no scleral icterus or erythema. Nose: Normal mucosa and septum. Oropharnyx: mmm, no lesions present.  Lungs: LS CTA, respirations without effort  Heart: regular rate and rhythm, S1, S2 normal, no murmur, click, rub or gallop  Abdomen: soft, non-tender; bowel sounds normal; no masses,  no organomegaly  Extremities: extremities normal, atraumatic, no cyanosis. Massive amounts of lymphedema present.   Back: kyphosis  Pulses: 2+ and symmetric  Skin: Skin color, texture, turgor normal. No rashes or lesions. Legs wrapped and bandaged but history of blisters present  Neurologic: Grossly normal   Psych: interacts well with caregivers, exhibits logical thought processes and connections, pleasant    LABS:   None today.     ASSESSMENT:      ICD-10-CM    1. Delirium R41.0    2. Agitation R45.1    3. Dementia with behavioral disturbance, unspecified dementia type F03.91        PLAN:    Delirium/Agitation/Dementia  -Provide safe and calming environment.   -Redirect patient frequently.   -Provide distracting activities.   -CBC, CMP, UA/UC, Valproate Level on Friday.   -Depakote 125 mg by mouth  twice daily.   -Psych consult to eval and treat.   -Follow closely.     Otherwise continue current care plan for all other chronic medical conditions. Encouraged patient to engage in healthy lifestyle behaviors such as engaging in social activities, exercising (PT/OT), eating well, and following care plan. Follow up for routine check-up, or sooner if needed. Will continue to monitor patient and work with nursing staff collaboratively to work toward positive patient outcomes.    Total unit time of 45 minutes spent with patient and nursing staff with greater than 50% of this time spent on review of previous records, counseling, education, and discussion of the above care plan with nursing staff and patient.     Electronically signed by: Mayela Yañez CNP

## 2021-06-21 NOTE — PROGRESS NOTES
[facility]      Facility:    [facility] [[id number]]    Code Status: FULL CODE ( Had been DNR in hospital, son stated she wanted it full code)  [facility] 10/15 through 10/18/18      Chief Complaint/Reason for Visit:  Chief Complaint   Patient presents with     Review Of Multiple Medical Conditions     dementia with hallucination       HPI:   Li is a 83 y.o. female who been living in her own home with failure to thrive, had bad legs at home, not compliant with dressing and support hose, would not always take her medications, especially Lasix because she did not want to have to go to the bathroom so often, she often had wet diapers, she was having frequent falls, developed a pressure ulcer of the sacral region.  Her decline was especially notable for the previous month.  She was admitted to the hospital and found to have CK= 381( mild rhabdomyolysis).  She was treated with IV fluids, was given ceftriaxone IV for a questionable UA.  She received a total of 3 days of IV antibiotics.  The culture itself had no growth.  Per her son, she has been on suppressive antibiotic (trimethoprim 100 mg every evening) for approximately 9 months per urologist they visited.    Her son said she really was not a candidate to go home to live alone.  They are searching for long-term care/assisted living facility.    Other medical issues:  History of COPD  Hypertension  Temporal arteritis on low-dose steroids for many years (approximately 15)  Dyslipidemia.  Osteopenia  Obesity  History of temporal arteritis still on steroids    [facility] She is been having sundowners symptoms with agitation, confusion, refusing cares.  According to her son Marc who checks on her the most of her 3 sons, this is always when she is getting a bladder infection.  He is also worried that after finishing the ceftriaxone in the hospital times 3 days, that she was not put back on her  "trimethoprim, and he fears that now she has an infection \"again\"        Past Medical History:  Past Medical History:   Diagnosis Date     Arthritis      Chronic respiratory failure (H)     home O2 therapy     Constipation     son states pt. has a little trouble with constipation--takes miralax     COPD (chronic obstructive pulmonary disease) (H)      Diabetes mellitus (H)     son states thinks she was borderline diabetic 20 yrs ago--just watched her diet at that time     Hyperlipidemia      Hypertension      IBS (irritable bowel syndrome)     not as of 9/21/14     Influenza due to influenza virus, type A, human      Obesity      Osteopenia      Temporal arteritis (H)              Review of Systems   She has had a lot of behaviors, yelling, refusing cares, all day, not just in the late afternoon and evening  Staff reports that her son thinks an antibiotic will clear up her mental status.  I called her son Marc: There was no answer left a message this is dementia, last week we had checked a follow-up UA to be sure it was clear was completely negative.  She is having conversations by herself, seeing people that are not there.  When I asked her how questions about specific points of her health: She said \"not used to questions.  They kept me in isolation for the last few months.\"  She could not tell me who, or where the isolation was except [place].  I asked her if she were hungry.  She answered, \"what does hungry mean.\"  She told another patient she hadn't been given food in days  She is on been a protein 2 scoops a day per recommendation of registered dietitian.  Nurse manager requested increase in Seroquel, she is very hard to keep safe.    Vitals:    10/29/18 0900   BP: 125/73   Pulse: 86   Resp: 20   Temp: 98  F (36.7  C)   SpO2: 98%       Physical Exam     Constitutional: Elderly  female, obese, hard of hearing but answers all questions   Hard of hearing even with voice augmentation headset "   Cardiovascular: Regular rhythm and normal heart sounds.  No murmur .  Pulmonary/Chest: Breath sounds normal. . She has no wheezes or rales.   Abdominal: Soft. Bowel sounds are normal.There is no tenderness.   Musculoskeletal: Limited shoulder range of motionHand grasp 4/5, thigh flexors extensors 3/5    wheel chair bound  Neurological: She is alert. She exhibits normal muscle tone. Clearly short-term memory deficits as well as hallucinations, having conversations with herself.No asymmetry   Skin: Skin is warm.   Unable to see her sacral area, obese, sitting in a wheelchair  Lymphedema of both lower extremities,  Minor blistering subcutaneously  Right fibular area of the shin with a 1 cm diameter superficial de-mallika  Psychiatric: She has a normal mood and affect.     Allergies   Allergen Reactions     Lorazepam Other (See Comments)     Confusion and it didn't work.     Other Environmental Allergy      Allergies to strong scents - soap, perfume     Statins-Hmg-Coa Reductase Inhibitors Myalgia     With elevated CK     [place]      Hydrocodone Rash     Prevacid [Lansoprazole]      Reaction: Stomach upset       Medication List:  Current Outpatient Prescriptions   Medication Sig     QUEtiapine (SEROQUEL) 50 MG tablet Take 50 mg by mouth every 8 (eight) hours.     albuterol (PROVENTIL HFA;VENTOLIN HFA) 90 mcg/actuation inhaler Inhale 2 puffs every 4 (four) hours as needed for wheezing.     aspirin 81 mg chewable tablet Chew 81 mg Daily at 8:00 am..     furosemide (LASIX) 20 MG tablet Take 20-40 mg by mouth see administration instructions. 40 mg in the morning and 20 mg in the afternoon.     ibuprofen (ADVIL,MOTRIN) 200 MG tablet Take 200 mg by mouth every 8 (eight) hours as needed for pain. OTC      metoprolol (TOPROL-XL) 50 MG 24 hr tablet Take 50 mg by mouth daily.     OXYGEN-AIR DELIVERY SYSTEMS MISC 2 L into each nostril At night PRN (SOB).     pediatric multivitamin (CHILDREN'S MULTIVIT W/EXTRA C) Chew chewable  tablet Chew 1 tablet daily.     PREDNISONE ORAL Take 9 mg by mouth daily with breakfast.        Labs:    Culture URINE  No Growth   Specimen Collected: 10/15/18  4:35 PM Last Resulted: 10/16/18                Ref Range & Units 10/15/18     WBC 4.0 - 11.0 thou/uL 10.0   Hemoglobin 12.0 - 16.0 g/dL 10.2 (L)   RDW 11.0 - 14.5 % 14.8 (H)   Platelets 140 - 440 thou/uL 280   Neutrophils % 50 - 70 % 80 (H)   Lymphocytes % 20 - 40 % 12 (L)        Ref Range & Units 10/15/18  5:18 PM     Sodium 136 - 145 mmol/L 136   Potassium 3.5 - 5.0 mmol/L 3.1 (L)   Chloride 98 - 107 mmol/L 91 (L)   CO2 22 - 31 mmol/L 31   Anion Gap, Calculation 5 - 18 mmol/L 14   Glucose 70 - 125 mg/dL 97   BUN 8 - 28 mg/dL 18   Creatinine 0.60 - 1.10 mg/dL 1.21 (H)   GFR MDRD Non Af Amer >60 mL/min/1.73m2 43 (L)   Bilirubin, Total 0.0 - 1.0 mg/dL 1.5 (H)   Calcium 8.5 - 10.5 mg/dL 8.8   Protein, Total 6.0 - 8.0 g/dL 6.2   Albumin 3.5 - 5.0 g/dL 2.7 (L)   Alkaline Phosphatase 45 - 120 U/L 70   AST 0 - 40 U/L 29   ALT 0 - 45 U/L 16     Lipase, magnesium = normal      Assessment / Plan:      ICD-10-CM    1. Dementia with behavioral disturbance, unspecified dementia type F03.91  check a CBC, comprehensive metabolic profile, TSH, B12 and folate to see if there is anything contributing these hallucinations and behavioral disturbances.   2. Hallucinations R44.3    3. Chronic obstructive pulmonary disease, unspecified COPD type (H) J44.9 Albuterol as needed; on O2   4. Adult failure to thrive R62.7 Needs LTC. On Benepro BID   5. Hypertension I10 Metoprolol   6. Pressure injury of sacral region, unstageable (H) L89.150 Wound cares   7. Obesity E66.9    8. Chronic venous insufficiency I87.2  needs compression wraps.  Only has gauze on her legs at this time   9. Hypokalemia E87.6  lab check will be done   On Prednisone for temporal arteritis      Electronically signed by: Urmila Luong MD

## 2021-06-21 NOTE — PROGRESS NOTES
[facility]    Facility:   [facility] [[id number]]   Code Status: FULL CODE      CHIEF COMPLAINT/REASON FOR VISIT:  Chief Complaint   Patient presents with     Review Of Multiple Medical Conditions     physical deconditioning, adult failure to thrive, UTI       HISTORY:      HPI: Li is a 83 y.o. female who been living in her own home with failure to thrive, had bad legs at home, not compliant with dressing and support hose, would not always take her medications, especially Lasix because she did not want to have to go to the bathroom so often, she often had wet diapers, she was having frequent falls, developed a pressure ulcer of the sacral region. Her decline was especially notable for the previous month. Li was admitted to the hospital and found to have CK= 381( mild rhabdomyolysis). She was treated with IV fluids, was given ceftriaxone IV for a questionable UA. She received a total of 3 days of IV antibiotics. The culture itself had no growth. Per her son, she has been on suppressive antibiotic (trimethoprim 100 mg every evening) for approximately 9 months per urologist they visited.    Today she is being evaluated for a routine review of multiple medical problems while in the TCU. She states she is overall doing well. She is happy with her current state, in fact the only thing she wants to do differently is to go on daily walks outside while the weather is nice. Per her and nursing report PT/OT are going well. She has no acute medical issues she would like to discuss. She denies any other concerns including fevers/chills, cough or cold symptoms, headaches, vision changes, chest pain/pressure, difficulty breathing, SOB, abdominal pain, nausea, vomiting, diarrhea, dysuria, increasing weakness, increasing pain. Per nursing her behaviors are not problematic at this time.     Past Medical History:   Diagnosis Date     Arthritis      Chronic respiratory failure (H)     home O2  therapy     Constipation     son states pt. has a little trouble with constipation--takes miralax     COPD (chronic obstructive pulmonary disease) (H)      Diabetes mellitus (H)     son states thinks she was borderline diabetic 20 yrs ago--just watched her diet at that time     Hyperlipidemia      Hypertension      IBS (irritable bowel syndrome)     not as of 9/21/14     Influenza due to influenza virus, type A, human      Obesity      Osteopenia      Temporal arteritis (H)              Family History   Problem Relation Age of Onset     Heart disease Father      Cancer Sister      Social History     Social History     Marital status:      Spouse name: N/A     Number of children: N/A     Years of education: N/A     Social History Main Topics     Smoking status: Former Smoker     Packs/day: 0.50     Years: 10.00     Quit date: 1/21/2000     Smokeless tobacco: Never Used      Comment: at least 10 yrs she smoked     Alcohol use No     Drug use: No     Sexual activity: Not on file     Other Topics Concern     Not on file     Social History Narrative       REVIEW OF SYSTEM:  Per HPI    PHYSICAL EXAM:   General appearance: alert, appears stated age and cooperative  HEENT: Head is normocephalic with normal hair distribution. No evidence of trauma. Ears: Without lesions or deformity. No acute purulent discharge. [place]. Eyes: Conjunctivae pink with no scleral icterus or erythema. Nose: Normal mucosa and septum. Oropharnyx: mmm, no lesions present.  Lungs: clear to auscultation bilaterally, respirations without effort  Heart: regular rate and rhythm, S1, S2 normal, no murmur, click, rub or gallop  Abdomen: soft, non-tender; bowel sounds normal; no masses,  no organomegaly  Extremities: extremities normal, atraumatic, no cyanosis. Massive amounts of lymphedema present.   Back: kyphosis  Pulses: 2+ and symmetric  Skin: Skin color, texture, turgor normal. No rashes or lesions. Legs wrapped and bandaged but history of  blisters present  Neurologic: Grossly normal   Psych: interacts well with caregivers, exhibits logical thought processes and connections, pleasant    LABS:   None today.     ASSESSMENT:      ICD-10-CM    1. Adult failure to thrive R62.7    2. Physical deconditioning R53.81    3. UTI (lower urinary tract infection) N39.0    4. Chronic obstructive pulmonary disease, unspecified COPD type (H) J44.9        PLAN:    Physical Deconditioning  -Continue PT/OT and other therapies as per care plan.  -Encouraged good nutrition and movement habits.   -Discussed care plan and expected course of stay.   -Continue to follow-up per routine schedule or sooner if needed.     Adult Failure to Thrive  -PT/OT.   -Dietician to eval for nutritional support.     UTI  -Trimethoprim 100 mg by mouth nightly.   -Encourage fluids.   -Follow up with urology per current schedule.     COPD  -Stable.     Otherwise continue current care plan for all other chronic medical conditions, as they are stable. Encouraged patient to engage in healthy lifestyle behaviors such as engaging in social activities, exercising (PT/OT), eating well, and following care plan. Follow up for routine check-up, or sooner if needed. Will continue to monitor patient and work with nursing staff collaboratively to work toward positive patient outcomes.    Electronically signed by: Mayela Yañez CNP

## 2021-06-22 NOTE — PROGRESS NOTES
[facility]      Facility:    [facility] [[id number]]    Code Status: FULL CODE ( Had been DNR in hospital, son stated she wanted it full code)  [facility]    10/15 through 10/18/18  [facility]    11/2 through 11/6/18        Chief Complaint/Reason for Visit:  Chief Complaint   Patient presents with     H & P     admission to LTC       HPI:   Li is a 83 y.o. female who been living in her own home with failure to thrive, had bad legs at home, not compliant with dressing and support hose, would not always take her medications, especially Lasix because she did not want to have to go to the bathroom so often, she often had wet diapers, she was having frequent falls, developed a pressure ulcer of the sacral region.  Her decline was especially notable for the previous month.  She was admitted to the hospital and found to have CK= 381( mild rhabdomyolysis).  She was treated with IV fluids, was given ceftriaxone IV for a questionable UA.  She received a total of 3 days of IV antibiotics.  The culture itself had no growth.  Per her son, she has been on suppressive antibiotic (trimethoprim 100 mg every evening) for approximately 9 months per urologist they visited.    Her son said she really was not a candidate to go home to live alone.  They are searching for long-term care/assisted living facility.    She was in TCU at [facility]: she had maría delerium and hallucinations, significant behaviors. She was sent to the ED: had a UTI ( UA was checked a few days earlier and was negative). Psychiatry started her on Seroquel with some improvement in her delirium. She transferred back to TCU, but had escalating and ongoing hallucinations and behaviors.  Seroquel was added. She completed therapy, was in need of LTC, and transferred to LTC on the third floor at [facility].    Other medical issues:  History of COPD  Hypertension  Temporal arteritis on low-dose steroids for many  years (approximately 15)  Dyslipidemia.  Osteopenia  Obesity  History of temporal arteritis still on steroids  .        Past Medical History:  Past Medical History:   Diagnosis Date     Arthritis      Chronic respiratory failure (H)     home O2 therapy     Constipation     son states pt. has a little trouble with constipation--takes miralax     COPD (chronic obstructive pulmonary disease) (H)      Diabetes mellitus (H)     son states thinks she was borderline diabetic 20 yrs ago--just watched her diet at that time     Hyperlipidemia      Hypertension      IBS (irritable bowel syndrome)     not as of 9/21/14     Influenza due to influenza virus, type A, human      Obesity      Osteopenia      Temporal arteritis (H)            Surgical History:  Past Surgical History:   Procedure Laterality Date     CATARACT EXTRACTION Bilateral      TEMPORAL ARTERY BIOPSY / LIGATION         Family History:   Family History   Problem Relation Age of Onset     Heart disease Father      Cancer Sister        Social History:    Social History     Social History     Marital status:      Spouse name: N/A     Number of children: N/A     Years of education: N/A     Social History Main Topics     Smoking status: Former Smoker     Packs/day: 0.50     Years: 10.00     Quit date: 1/21/2000     Smokeless tobacco: Never Used      Comment: at least 10 yrs she smoked     Alcohol use No     Drug use: No     Sexual activity: Not on file            Review of Systems   She has no recollection of her behaviors, talkative but little content in her speech.  Due to significant dementia, she can not answer ROS    Vitals:    11/20/18 0900   BP: 129/72   Pulse: 61   Resp: 18   Temp: 96.7  F (35.9  C)   SpO2: 94%       Physical Exam    Constitutional: Elderly  female, obese, hard of hearing   Left lower eyelid cyst. The remainder of the HEENT is normal  Cardiovascular: Regular rhythm and normal heart sounds. No murmur.   Pulmonary/Chest: Breath  sounds normal.  She has no rales.   Abdominal: Soft. Bowel sounds are normal. She exhibits no distension. There is no tenderness.   Musculoskeletal:Limited shoulder range of motion  Hand grasp 4/5, thigh flexors extensors 3/5   Lymphadenopathy:     She has no cervical adenopathy.   Neurological: She is alert. She exhibits normal muscle tone.   Clearly  Significant short-term memory deficits  No asymmetry   Skin: Skin is warm.   Marked Lymphedema of both lower extremities, dark pink coloration  Minor blistering subcutaneously  Psychiatric: She presents a normal  affect. But , other times yelling out, refusing cares  and medications. Still having occasional hallucinations   On Bactrim for suppression of frequent cyctitis      Allergies   Allergen Reactions     Lorazepam Other (See Comments)     Confusion and it didn't work.     Other Environmental Allergy      Allergies to strong scents - soap, perfume     Statins-Hmg-Coa Reductase Inhibitors Myalgia     With elevated CK     [place]      Hydrocodone Rash     Prevacid [Lansoprazole]      Reaction: Stomach upset       Medication List:  Current Outpatient Medications   Medication Sig     albuterol (PROVENTIL HFA;VENTOLIN HFA) 90 mcg/actuation inhaler Inhale 2 puffs every 4 (four) hours as needed for wheezing.     aspirin 81 mg chewable tablet Chew 81 mg Daily at 8:00 am..     divalproex (DEPAKOTE DR) 125 MG 12 hour tablet Take 125 mg by mouth 2 (two) times a day.     furosemide (LASIX) 20 MG tablet Take by mouth see administration instructions. 40 mg in the morning and 20 mg in the afternoon.     ibuprofen (ADVIL,MOTRIN) 200 MG tablet Take 200 mg by mouth every 8 (eight) hours as needed for pain. OTC      metoprolol (TOPROL-XL) 50 MG 24 hr tablet Take 50 mg by mouth daily.     OXYGEN-AIR DELIVERY SYSTEMS MISC 2 L into each nostril At night PRN (SOB).     pediatric multivitamin (CHILDREN'S MULTIVIT W/EXTRA C) Chew chewable tablet Chew 1 tablet daily.     PREDNISONE ORAL  Take 9 mg by mouth daily with breakfast.      QUEtiapine (SEROQUEL) 25 MG tablet Take 0.5 tablets (12.5 mg total) by mouth 3 (three) times a day for 2 days.     QUEtiapine (SEROQUEL) 50 MG tablet Take 1 tablet (50 mg total) by mouth at bedtime. (Patient taking differently: Take 50 mg by mouth 4 (four) times a day .      )     senna (SENOKOT) 8.6 mg tablet Take 2 tablets by mouth 2 (two) times a day as needed for constipation.     trimethoprim (TRIMPEX) 100 mg tablet Take 100 mg by mouth every evening.         Labs:   Ref Range & Units 11/28/18 0652   Potassium 3.5 - 5.0 mmol/L 4.2       Ref Range & Units 11/28/18 0652 11/19/18 0510   Valproic Acid 50.0 - 150.0 ug/mL 23.9 Abnormally low   21.5 Abnormally low  CM      Ref Range & Units 11/19/18 0510   Ammonia 11 - 35 umol/L 22       Ref Range & Units 11/19/18 0510 11/6/18 0729   WBC 4.0 - 11.0 thou/uL 8.9  10.3    RBC 3.80 - 5.40 mill/uL 3.54 Abnormally low   3.49 Abnormally low     Hemoglobin 12.0 - 16.0 g/dL 10.1 Abnormally low   10.1 Abnormally low     Hematocrit 35.0 - 47.0 % 33.9 Abnormally low   34.1 Abnormally low     MCV 80 - 100 fL 96  98    MCH 27.0 - 34.0 pg 28.5  28.9    MCHC 32.0 - 36.0 g/dL 29.8 Abnormally low   29.6 Abnormally low     RDW 11.0 - 14.5 % 15.3 Abnormally high   15.2 Abnormally high     Platelets 140 - 440 thou/uL 294  273         Ref Range & Units 11/6/18 0729   Sodium 136 - 145 mmol/L 140    Potassium 3.5 - 5.0 mmol/L 4.3    Chloride 98 - 107 mmol/L 101    CO2 22 - 31 mmol/L 28    Anion Gap, Calculation 5 - 18 mmol/L 11    Glucose 70 - 125 mg/dL 87    Calcium 8.5 - 10.5 mg/dL 8.7    BUN 8 - 28 mg/dL 23    Creatinine 0.60 - 1.10 mg/dL 1.16 Abnormally high     GFR MDRD Non Af Amer >60 mL/min/1.73m2 45 Abnormally low         Ref Range & Units 11/6/18    WBC 4.0 - 11.0 thou/uL 10.3    Hemoglobin 12.0 - 16.0 g/dL 10.1 Abnormally low     RDW 11.0 - 14.5 % 15.2 Abnormally high     Platelets 140 - 440 thou/uL 273       Ref Range & Units 11/6/18     Magnesium 1.8 - 2.6 mg/dL 2.3         Culture URINE  No Growth   Specimen Collected: 10/15/18  4:35 PM Last Resulted: 10/16/18                Ref Range & Units 10/15/18     WBC 4.0 - 11.0 thou/uL 10.0   Hemoglobin 12.0 - 16.0 g/dL 10.2 (L)   RDW 11.0 - 14.5 % 14.8 (H)   Platelets 140 - 440 thou/uL 280   Neutrophils % 50 - 70 % 80 (H)   Lymphocytes % 20 - 40 % 12 (L)        Ref Range & Units 10/15/18  5:18 PM     Sodium 136 - 145 mmol/L 136   Potassium 3.5 - 5.0 mmol/L 3.1 (L)   Chloride 98 - 107 mmol/L 91 (L)   CO2 22 - 31 mmol/L 31   Anion Gap, Calculation 5 - 18 mmol/L 14   Glucose 70 - 125 mg/dL 97   BUN 8 - 28 mg/dL 18   Creatinine 0.60 - 1.10 mg/dL 1.21 (H)   GFR MDRD Non Af Amer >60 mL/min/1.73m2 43 (L)   Bilirubin, Total 0.0 - 1.0 mg/dL 1.5 (H)   Calcium 8.5 - 10.5 mg/dL 8.8   Protein, Total 6.0 - 8.0 g/dL 6.2   Albumin 3.5 - 5.0 g/dL 2.7 (L)   Alkaline Phosphatase 45 - 120 U/L 70   AST 0 - 40 U/L 29   ALT 0 - 45 U/L 16     Lipase, magnesium = normal      Assessment / Plan:    ICD-10-CM    1. Dementia with behavioral disturbance, unspecified dementia type F03.91 Seroquel QID   2. Essential hypertension I10 metoprolol   3. Chronic obstructive pulmonary disease, unspecified COPD type (H) J44.9 Proventil, O2 at night   4. 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 Chronic venous insufficiency I87.2 lasix   6. Falls frequently R29.6    7. Delirium R41.0 seroquel           Electronically signed by: Urmila Luong MD

## 2021-06-23 NOTE — PROGRESS NOTES
[facility]    Facility:   [facility]  [[id number]]   Code Status: FULL CODE      CHIEF COMPLAINT/REASON FOR VISIT:  Chief Complaint   Patient presents with     Problem Visit     dementia, delusions       HISTORY:      HPI: Li is a 83 y.o. female who been living in her own home with failure to thrive, had bad legs at home, not compliant with dressing and support hose, would not always take her medications, especially Lasix because she did not want to have to go to the bathroom so often, she often had wet diapers, she was having frequent falls, developed a pressure ulcer of the sacral region. Her decline was especially notable for the previous month. Li was admitted to the hospital and found to have CK= 381( mild rhabdomyolysis). She was treated with IV fluids, was given ceftriaxone IV for a questionable UA. She received a total of 3 days of IV antibiotics. The culture itself had no growth. Per her son, she has been on suppressive antibiotic (trimethoprim 100 mg every evening) for approximately 9 months per urologist they visited. Li has been transferred to LTC.     Today she is being evaluated for ongoing delusions. Li often has elaborate stories and delusions. She will often times be appropriate with her delusions and behaviors but there are times when Li will say very inappropriate things to staff and other residents. Family would like for Li to come off of the Seroquel and attempt to be more controlled with depakote. Li does not recognize these thoughts are untrue or not real. She does not have any new concerns. Li denies any concerns including fevers/chills, cough or cold symptoms, headaches, vision changes, chest pain/pressure, difficulty breathing, SOB, abdominal pain, nausea, vomiting, diarrhea, dysuria, increasing weakness, increasing pain.     Past Medical History:   Diagnosis Date     Arthritis      Chronic respiratory failure (H)     home O2 therapy      Constipation     son states pt. has a little trouble with constipation--takes miralax     COPD (chronic obstructive pulmonary disease) (H)      Diabetes mellitus (H)     son states thinks she was borderline diabetic 20 yrs ago--just watched her diet at that time     Hyperlipidemia      Hypertension      IBS (irritable bowel syndrome)     not as of 14     Influenza due to influenza virus, type A, human      Obesity      Osteopenia      Temporal arteritis (H)              Family History   Problem Relation Age of Onset     Heart disease Father      Cancer Sister      Social History     Socioeconomic History     Marital status:      Spouse name: None     Number of children: None     Years of education: None     Highest education level: None   Social Needs     Financial resource strain: None     Food insecurity - worry: None     Food insecurity - inability: None     Transportation needs - medical: None     Transportation needs - non-medical: None   Occupational History     None   Tobacco Use     Smoking status: Former Smoker     Packs/day: 0.50     Years: 10.00     Pack years: 5.00     Last attempt to quit: 2000     Years since quittin.0     Smokeless tobacco: Never Used     Tobacco comment: at least 10 yrs she smoked   Substance and Sexual Activity     Alcohol use: No     Drug use: No     Sexual activity: None   Other Topics Concern     None   Social History Narrative     None       REVIEW OF SYSTEM:  Per HPI    PHYSICAL EXAM:   General appearance: alert, appears stated age and cooperative  HEENT: Head is normocephalic with normal hair distribution. No evidence of trauma. Ears: Without lesions or deformity. No acute purulent discharge. [place]. Eyes: Conjunctivae pink with no scleral icterus or erythema. Nose: Normal mucosa and septum. Oropharnyx: mmm, no lesions present.  Lungs: respirations without effort.  Extremities: extremities normal, atraumatic, no cyanosis. Massive amounts of lymphedema  present.   Back: kyphosis  Pulses: 2+ and symmetric  Skin: Skin color, texture, turgor normal. No rashes or lesions. Legs wrapped and bandaged but history of blisters present  Neurologic: Grossly normal   Psych: interacts well with caregivers, does NOT exhibits logical thought processes and connections--advanced dementia with delusions, pleasant    LABS:   None today.     ASSESSMENT:      ICD-10-CM    1. Dementia with behavioral disturbance, unspecified dementia type F03.91    2. Delusions (H) F22        PLAN:    Delusions/Agitation/Dementia  -Provide safe and calming environment.   -Redirect patient frequently.   -Provide distracting activities.   -Valproate Level on Monday.   -Depakote 125 mg by mouth in am and in afternoon, 250 mg by mouth at bedtime.   -Psych consult to eval and treat.   -Follow closely.     Otherwise continue current care plan for all other chronic medical conditions. Encouraged patient to engage in healthy lifestyle behaviors such as engaging in social activities, exercising (PT/OT), eating well, and following care plan. Follow up for routine check-up, or sooner if needed. Will continue to monitor patient and work with nursing staff collaboratively to work toward positive patient outcomes.    Electronically signed by: Mayela Yañez CNP

## 2021-06-23 NOTE — PROGRESS NOTES
[facility]      Facility:    [facility] [[id number]]    Code Status: FULL CODE ( Had been DNR in hospital, son stated she wanted it full code)  [facility]    10/15 through 10/18/18  [facility]    11/2 through 11/6/18        Chief Complaint/Reason for Visit:  Chief Complaint   Patient presents with     Review Of Multiple Medical Conditions       HPI:   Li is a 83 y.o. female who been living in her own home with failure to thrive, had bad legs at home, not compliant with dressing and support hose, would not always take her medications, especially Lasix because she did not want to have to go to the bathroom so often, she often had wet diapers, she was having frequent falls, developed a pressure ulcer of the sacral region.  Her decline was especially notable for the previous month.  She was admitted to the hospital and found to have CK= 381( mild rhabdomyolysis).  She was treated with IV fluids, was given ceftriaxone IV for a questionable UA.  She received a total of 3 days of IV antibiotics.  The culture itself had no growth.  Per her son, she has been on suppressive antibiotic (trimethoprim 100 mg every evening) for approximately 9 months per urologist they visited.    Her son said she really was not a candidate to go home to live alone.  They are searching for long-term care/assisted living facility.    She was in TCU at [facility]: she had maría delerium and hallucinations, significant behaviors. She was sent to the ED: had a UTI ( UA was checked a few days earlier and was negative). Psychiatry started her on Seroquel with some improvement in her delirium. She transferred back to TCU, but had escalating and ongoing hallucinations and behaviors.  Seroquel was added. She completed therapy, was in need of LTC, and transferred to LTC on the third floor at [facility].    Other medical issues:  History of COPD  Hypertension  Temporal arteritis on low-dose  steroids for many years (approximately 15)  Dyslipidemia.  Osteopenia  Obesity  History of temporal arteritis still on steroids  .    UPDATE:  Has had delerium, verbal aggression, exit seeking behavior    Past Medical History:  Past Medical History:   Diagnosis Date     Arthritis      Chronic respiratory failure (H)     home O2 therapy     Constipation     son states pt. has a little trouble with constipation--takes miralax     COPD (chronic obstructive pulmonary disease) (H)      Diabetes mellitus (H)     son states thinks she was borderline diabetic 20 yrs ago--just watched her diet at that time     Hyperlipidemia      Hypertension      IBS (irritable bowel syndrome)     not as of 9/21/14     Influenza due to influenza virus, type A, human      Obesity      Osteopenia      Temporal arteritis (H)                   Review of Systems   Lethargic, responds to questions but little content in her speech.  Refuses cares at times  Due to significant dementia, she can not answer ROS    Vitals:    01/31/19 0900   BP: 155/73   Pulse: 78   Resp: 18   Temp: 97.3  F (36.3  C)   SpO2: 94%       Physical Exam    Constitutional: Elderly morbidly obese, hard of hearing   Left lower eyelid cyst, no erythema.   Cardiovascular: Regular rhythm and normal heart sounds. No murmur.   Pulmonary/Chest: Breath sounds clear  Abdominal: Soft. Bowel sounds are normal, no tenderness.   Musculoskeletal:Limited shoulder range of motion  Hand grasp 4/5, thigh flexors extensors 3/5   Neurological: She is drowsy, slouching in wheelchair.Clearly  Significant short-term memory deficits  No asymmetry   Skin: Skin is warm. Marked Lymphedema of both lower extremities, dark pink coloration. No support hose in place  Psychiatric: Fairly flat affect.     On Bactrim for suppression of frequent cyctitis      Allergies   Allergen Reactions     Lorazepam Other (See Comments)     Confusion and it didn't work.     Other Environmental Allergy      Allergies to  strong scents - soap, perfume     Statins-Hmg-Coa Reductase Inhibitors Myalgia     With elevated CK     [place]      Hydrocodone Rash     Prevacid [Lansoprazole]      Reaction: Stomach upset       Medication List:  Current Outpatient Medications   Medication Sig     albuterol (PROVENTIL HFA;VENTOLIN HFA) 90 mcg/actuation inhaler Inhale 2 puffs every 4 (four) hours as needed for wheezing.     aspirin 81 mg chewable tablet Chew 81 mg Daily at 8:00 am..     divalproex (DEPAKOTE DR) 125 MG 12 hour tablet Take 125 mg by mouth 2 (two) times a day.     furosemide (LASIX) 20 MG tablet Take by mouth see administration instructions. 40 mg in the morning and 20 mg in the afternoon.     ibuprofen (ADVIL,MOTRIN) 200 MG tablet Take 200 mg by mouth every 8 (eight) hours as needed for pain. OTC      metoprolol (TOPROL-XL) 50 MG 24 hr tablet Take 50 mg by mouth daily.     OXYGEN-AIR DELIVERY SYSTEMS MISC 2 L into each nostril At night PRN (SOB).     pediatric multivitamin (CHILDREN'S MULTIVIT W/EXTRA C) Chew chewable tablet Chew 1 tablet daily.     PREDNISONE ORAL Take 9 mg by mouth daily with breakfast.      QUEtiapine (SEROQUEL) 25 MG tablet Take 0.5 tablets (12.5 mg total) by mouth 3 (three) times a day for 2 days.     QUEtiapine (SEROQUEL) 50 MG tablet Take 1 tablet (50 mg total) by mouth at bedtime. (Patient taking differently: Take 50 mg by mouth 4 (four) times a day .      )     senna (SENOKOT) 8.6 mg tablet Take 2 tablets by mouth 2 (two) times a day as needed for constipation.     trimethoprim (TRIMPEX) 100 mg tablet Take 100 mg by mouth every evening.         Labs:   Ref Range & Units 11/28/18 0652   Potassium 3.5 - 5.0 mmol/L 4.2       Ref Range & Units 11/28/18 0652 11/19/18 0510   Valproic Acid 50.0 - 150.0 ug/mL 23.9 Abnormally low   21.5 Abnormally low  CM      Ref Range & Units 11/19/18 0510   Ammonia 11 - 35 umol/L 22       Ref Range & Units 11/19/18 0510 11/6/18 0729   WBC 4.0 - 11.0 thou/uL 8.9  10.3    RBC 3.80 -  5.40 mill/uL 3.54 Abnormally low   3.49 Abnormally low     Hemoglobin 12.0 - 16.0 g/dL 10.1 Abnormally low   10.1 Abnormally low     Hematocrit 35.0 - 47.0 % 33.9 Abnormally low   34.1 Abnormally low     MCV 80 - 100 fL 96  98    MCH 27.0 - 34.0 pg 28.5  28.9    MCHC 32.0 - 36.0 g/dL 29.8 Abnormally low   29.6 Abnormally low     RDW 11.0 - 14.5 % 15.3 Abnormally high   15.2 Abnormally high     Platelets 140 - 440 thou/uL 294  273         Ref Range & Units 11/6/18 0729   Sodium 136 - 145 mmol/L 140    Potassium 3.5 - 5.0 mmol/L 4.3    Chloride 98 - 107 mmol/L 101    CO2 22 - 31 mmol/L 28    Anion Gap, Calculation 5 - 18 mmol/L 11    Glucose 70 - 125 mg/dL 87    Calcium 8.5 - 10.5 mg/dL 8.7    BUN 8 - 28 mg/dL 23    Creatinine 0.60 - 1.10 mg/dL 1.16 Abnormally high     GFR MDRD Non Af Amer >60 mL/min/1.73m2 45 Abnormally low         Ref Range & Units 11/6/18    WBC 4.0 - 11.0 thou/uL 10.3    Hemoglobin 12.0 - 16.0 g/dL 10.1 Abnormally low     RDW 11.0 - 14.5 % 15.2 Abnormally high     Platelets 140 - 440 thou/uL 273       Ref Range & Units 11/6/18    Magnesium 1.8 - 2.6 mg/dL 2.3             Assessment / Plan:    ICD-10-CM    1. Dementia with behavioral disturbance, unspecified dementia type F03.91 Depakote and Seroquel helping some    2. Chronic obstructive pulmonary disease, unspecified COPD type (H) J44.9 Prednisone   3. Essential hypertension I10 Acceptable systolics for her age on Metoprolol   4. Chronic venous insufficiency I87.2 pInactive, stable, not using suoport hose   5. Chronic cystitis N30.20 Chronic Trimethoprim             Electronically signed by: Urmila Luong MD

## 2021-06-24 NOTE — PROGRESS NOTES
"[facility]    Facility:   [place] [facility] [[id number]]   Code Status: FULL CODE      CHIEF COMPLAINT/REASON FOR VISIT:  Chief Complaint   Patient presents with     Problem Visit     falls, behavioral changes       HISTORY:      HPI: Li is a 83 y.o. female who been living in her own home with failure to thrive, had bad legs at home, not compliant with dressing and support hose, would not always take her medications, especially Lasix because she did not want to have to go to the bathroom so often, she often had wet diapers, she was having frequent falls, developed a pressure ulcer of the sacral region. Her decline was especially notable for the previous month. Li was admitted to the hospital and found to have CK= 381( mild rhabdomyolysis). She was treated with IV fluids, was given ceftriaxone IV for a questionable UA. She received a total of 3 days of IV antibiotics. The culture itself had no growth. Per her son, she has been on suppressive antibiotic (trimethoprim 100 mg every evening) for approximately 9 months per urologist they visited. Li has been transferred to LTC.     Today Li is being evaluated after having several falls over the weekend and several issues with behavioral changes. Li feels she is fine, she is very confused (confusion is her normal state). However, nursing is states she is off and is just not \"right\". Li does not have any new issues to report. She is feeling otherwise ok. She does wince in pain or cry in pain with palpations to any of her bruised areas. Li does not have any other concerns or reports.     Past Medical History:   Diagnosis Date     Arthritis      Chronic respiratory failure (H)     home O2 therapy     Constipation     son states pt. has a little trouble with constipation--takes miralax     COPD (chronic obstructive pulmonary disease) (H)      Diabetes mellitus (H)     son states thinks she was borderline diabetic 20 yrs ago--just watched " her diet at that time     Hyperlipidemia      Hypertension      IBS (irritable bowel syndrome)     not as of 14     Influenza due to influenza virus, type A, human      Obesity      Osteopenia      Temporal arteritis (H)              Family History   Problem Relation Age of Onset     Heart disease Father      Cancer Sister      Social History     Socioeconomic History     Marital status:      Spouse name: None     Number of children: None     Years of education: None     Highest education level: None   Occupational History     None   Social Needs     Financial resource strain: None     Food insecurity:     Worry: None     Inability: None     Transportation needs:     Medical: None     Non-medical: None   Tobacco Use     Smoking status: Former Smoker     Packs/day: 0.50     Years: 10.00     Pack years: 5.00     Last attempt to quit: 2000     Years since quittin.1     Smokeless tobacco: Never Used     Tobacco comment: at least 10 yrs she smoked   Substance and Sexual Activity     Alcohol use: No     Drug use: No     Sexual activity: None   Lifestyle     Physical activity:     Days per week: None     Minutes per session: None     Stress: None   Relationships     Social connections:     Talks on phone: None     Gets together: None     Attends Sabianist service: None     Active member of club or organization: None     Attends meetings of clubs or organizations: None     Relationship status: None     Intimate partner violence:     Fear of current or ex partner: None     Emotionally abused: None     Physically abused: None     Forced sexual activity: None   Other Topics Concern     None   Social History Narrative     None       REVIEW OF SYSTEM:  Per HPI    PHYSICAL EXAM:   General appearance: alert, appears stated age and cooperative  HEENT: Head is normocephalic with normal hair distribution. Facial trauma with bruising and hematoma to forehead. Ears: Without lesions or deformity. No acute purulent  discharge. [place]. Eyes: Conjunctivae pink with no scleral icterus or erythema. Nose: Normal mucosa and septum. Oropharnyx: mmm, no lesions present.  Lungs: respirations without effort, LS CTA  Cardiovascular: S1, S2, irregular rhythm, regular rate  Abdomen: soft, tender to lower abdomen, +CVAT  Extremities: extremities normal, atraumatic, no cyanosis. Massive amounts of lymphedema present.   Back: kyphosis  Pulses: 2+ and symmetric  Skin: Skin color, texture, turgor normal. No rashes or lesions. Bruising over face, right shoulder.  Neurologic: Grossly normal   Psych: interacts well with caregivers, does NOT exhibits logical thought processes and connections--advanced dementia with delusions, pleasant    LABS:   CBC, CMP, Procalcitonin, UA/UC.     ASSESSMENT:      ICD-10-CM    1. Falls frequently R29.6    2. Cognitive and behavioral changes R41.89     R46.89        PLAN:    Falls  -Xray face, orbits, right shoulder, thoracic spine STAT.   -Ice to all bruised or tender areas 4 times daily at a minimum.   -Tylenol 500 mg by mouth every 6 hours.   -Sling to right arm for comfort.     Cognitive and Behavioral Changes  -CBC, CMP, Depakote level, Procalcitonin STAT.   -UA/UC.   -Careful monitoring.     Otherwise continue current care plan for all other chronic medical conditions. Encouraged patient to engage in healthy lifestyle behaviors such as engaging in social activities, exercising (PT/OT), eating well, and following care plan. Follow up for routine check-up, or sooner if needed. Will continue to monitor patient and work with nursing staff collaboratively to work toward positive patient outcomes.    Electronically signed by: Mayela Yañez CNP

## 2021-06-24 NOTE — PROGRESS NOTES
[facility]    Facility:   [place] [facility] [[id number]]   Code Status: FULL CODE      CHIEF COMPLAINT/REASON FOR VISIT:  Chief Complaint   Patient presents with     Problem Visit     dysuria, behavior changes       HISTORY:      HPI: Li is a 83 y.o. female who been living in her own home with failure to thrive, had bad legs at home, not compliant with dressing and support hose, would not always take her medications, especially Lasix because she did not want to have to go to the bathroom so often, she often had wet diapers, she was having frequent falls, developed a pressure ulcer of the sacral region. Her decline was especially notable for the previous month. Li was admitted to the hospital and found to have CK= 381( mild rhabdomyolysis). She was treated with IV fluids, was given ceftriaxone IV for a questionable UA. She received a total of 3 days of IV antibiotics. The culture itself had no growth. Per her son, she has been on suppressive antibiotic (trimethoprim 100 mg every evening) for approximately 9 months per urologist they visited. Li has been transferred to LTC.     Today she is being evaluated for reports of hematuria and dysuria. This has been an issue for the past 3 to 4 days per nursing report. Li is quite demented and does not recall what is going on easily. She states her stomach is bothering her but she is mostly upset about her leg wraps. Li is perseverating on her leg wraps today and does not have anything further to add. She just does not want to wear the leg wraps because they are bothersome to her. She isn't sure if she has had any other symptoms or issues.      Past Medical History:   Diagnosis Date     Arthritis      Chronic respiratory failure (H)     home O2 therapy     Constipation     son states pt. has a little trouble with constipation--takes miralax     COPD (chronic obstructive pulmonary disease) (H)      Diabetes mellitus (H)     son states thinks  she was borderline diabetic 20 yrs ago--just watched her diet at that time     Hyperlipidemia      Hypertension      IBS (irritable bowel syndrome)     not as of 14     Influenza due to influenza virus, type A, human      Obesity      Osteopenia      Temporal arteritis (H)              Family History   Problem Relation Age of Onset     Heart disease Father      Cancer Sister      Social History     Socioeconomic History     Marital status:      Spouse name: None     Number of children: None     Years of education: None     Highest education level: None   Social Needs     Financial resource strain: None     Food insecurity - worry: None     Food insecurity - inability: None     Transportation needs - medical: None     Transportation needs - non-medical: None   Occupational History     None   Tobacco Use     Smoking status: Former Smoker     Packs/day: 0.50     Years: 10.00     Pack years: 5.00     Last attempt to quit: 2000     Years since quittin.0     Smokeless tobacco: Never Used     Tobacco comment: at least 10 yrs she smoked   Substance and Sexual Activity     Alcohol use: No     Drug use: No     Sexual activity: None   Other Topics Concern     None   Social History Narrative     None       REVIEW OF SYSTEM:  Per HPI    PHYSICAL EXAM:   General appearance: alert, appears stated age and cooperative  HEENT: Head is normocephalic with normal hair distribution. No evidence of trauma. Ears: Without lesions or deformity. No acute purulent discharge. [place]. Eyes: Conjunctivae pink with no scleral icterus or erythema. Nose: Normal mucosa and septum. Oropharnyx: mmm, no lesions present.  Lungs: respirations without effort, LS CTA  Cardiovascular: S1, S2, irregular rhythm, regular rate  Abdomen: soft, tender to lower abdomen, +CVAT  Extremities: extremities normal, atraumatic, no cyanosis. Massive amounts of lymphedema present.   Back: kyphosis  Pulses: 2+ and symmetric  Skin: Skin color, texture,  turgor normal. No rashes or lesions. Legs wrapped and bandaged but history of blisters present  Neurologic: Grossly normal   Psych: interacts well with caregivers, does NOT exhibits logical thought processes and connections--advanced dementia with delusions, pleasant    LABS:   Lab Results   Component Value Date    COLORU Yellow 02/11/2019    CLARITYU Slightly Cloudy (!) 02/11/2019    GLUCOSEU Negative 02/11/2019    BILIRUBINUR Negative 02/11/2019    KETONESU Negative 02/11/2019    SPECGRAV 1.025 02/11/2019    HGBUA Large (!) 02/11/2019    PHUR 6.0 02/11/2019    PROTEINUA Negative 02/11/2019    UROBILINOGEN <2.0 E.U./dL 02/11/2019    NITRITE Negative 02/11/2019    LEUKOCYTESUR Moderate (!) 02/11/2019    BACTERIA Few (!) 02/11/2019    RBCUA 25-50 (!) 02/11/2019    WBCUA 5-10 (!) 02/11/2019    SQUAMEPI 0-5 02/11/2019     Culture   Date Value Ref Range Status   02/11/2019 >100,000 col/ml Enterococcus faecalis (!)  Final   02/11/2019 10,000-50,000 col/ml Proteus mirabilis (!)  Final       ASSESSMENT:      ICD-10-CM    1. UTI (lower urinary tract infection) N39.0        PLAN:    UTI  -Ampicillin 500 mg by mouth twice daily for 7 days.   -Encourage fluids.   -May consider the use of a prophylactic antibiotic or estrace cream to help address recurrent UTIs.   -Follow carefully and report any changes immediately to provider team.     Otherwise continue current care plan for all other chronic medical conditions. Encouraged patient to engage in healthy lifestyle behaviors such as engaging in social activities, exercising (PT/OT), eating well, and following care plan. Follow up for routine check-up, or sooner if needed. Will continue to monitor patient and work with nursing staff collaboratively to work toward positive patient outcomes.    Electronically signed by: Mayela Yañez CNP

## 2021-06-24 NOTE — TELEPHONE ENCOUNTER
[facility] Nurse Triage Telephone Note      Provider: TIERRA Urbina  Facility: [facility]    Facility Type: LTC      Call Back Number:  [phone]    Allergies: Lorazepam; Other environmental allergy; Statins-hmg-coa reductase inhibitors; [place]; Hydrocodone; and Prevacid [lansoprazole]    Reason for call: UC sensitivities have arrived.       Verbal Order/Direction given by Provider: Ampicillin 250mg PO Q 6 hours x 7 days for UTI.      Provider giving order: TIERRA Urbina    Verbal order given to: Makenna Simmons RN

## 2021-06-25 NOTE — PROGRESS NOTES
[facility]    Facility:   [place] DEVORAH ALEJANDRE [[id number]]   Code Status: FULL CODE      CHIEF COMPLAINT/REASON FOR VISIT:  Chief Complaint   Patient presents with     Problem Visit     falls, delusions, agitation       HISTORY:      HPI: Li is a 83 y.o. female who been living in her own home with failure to thrive, had bad legs at home, not compliant with dressing and support hose, would not always take her medications, especially Lasix because she did not want to have to go to the bathroom so often, she often had wet diapers, she was having frequent falls, developed a pressure ulcer of the sacral region. Her decline was especially notable for the previous month. Li was admitted to the hospital and found to have CK= 381( mild rhabdomyolysis). She was treated with IV fluids, was given ceftriaxone IV for a questionable UA. She received a total of 3 days of IV antibiotics. The culture itself had no growth. Per her son, she has been on suppressive antibiotic (trimethoprim 100 mg every evening) for approximately 9 months per urologist they visited. Li has been transferred to LT.     Today Li is being evaluated after again falling and continuing to not necessarily be herself. Did speak with family at length to get more insight into the issue. Her son Marc shared that he is worried that she may have issues with temporal arteritis. She has had this issue multiple times in the past, she has been weaning off of prednisone and has not ever tolerated that well in the past. The neurologist she was seeing previously said she may not every be able to come off of prednisone. He did suggest tapering quite slowly, which is occurring now--however Marc states that may be the issue at hand. She is currently taking 5 mg of Prednisone by mouth daily. Li does not have any other issues to report. Nursing staff share she continues to be off. She is redirectable but does have delusions and confusion that is  different than her baseline. Li is alert and oriented to herself. She does not have any new aches or pains, does state that she has had a headaches intermittently her whole life. She does not participate in a full ROS. Vital signs have been stable.     Did discuss further with Marc and nurse manager April, all parties agree that STAT labs will be drawn. If Sed rate or CRP are elevated will go forward with increasing prednisone and managing it as a temporal arteritis flare and reaction to decrease in prednisone. If this is not the case, will evaluate further with admission to hospital for imaging and evaluation of falls, etc. Will continue to attempt prednisone wean after episode, but will do so even slower. Plan to follow with neurology as well. Li also may need an adjustment of depakote for mood stabilization.     Past Medical History:   Diagnosis Date     Arthritis      Chronic respiratory failure (H)     home O2 therapy     Constipation     son states pt. has a little trouble with constipation--takes miralax     COPD (chronic obstructive pulmonary disease) (H)      Diabetes mellitus (H)     son states thinks she was borderline diabetic 20 yrs ago--just watched her diet at that time     Hyperlipidemia      Hypertension      IBS (irritable bowel syndrome)     not as of 9/21/14     Influenza due to influenza virus, type A, human      Obesity      Osteopenia      Temporal arteritis (H)              Family History   Problem Relation Age of Onset     Heart disease Father      Cancer Sister      Social History     Socioeconomic History     Marital status:      Spouse name: None     Number of children: None     Years of education: None     Highest education level: None   Occupational History     None   Social Needs     Financial resource strain: None     Food insecurity:     Worry: None     Inability: None     Transportation needs:     Medical: None     Non-medical: None   Tobacco Use     Smoking status:  Former Smoker     Packs/day: 0.50     Years: 10.00     Pack years: 5.00     Last attempt to quit: 2000     Years since quittin.1     Smokeless tobacco: Never Used     Tobacco comment: at least 10 yrs she smoked   Substance and Sexual Activity     Alcohol use: No     Drug use: No     Sexual activity: None   Lifestyle     Physical activity:     Days per week: None     Minutes per session: None     Stress: None   Relationships     Social connections:     Talks on phone: None     Gets together: None     Attends Orthodox service: None     Active member of club or organization: None     Attends meetings of clubs or organizations: None     Relationship status: None     Intimate partner violence:     Fear of current or ex partner: None     Emotionally abused: None     Physically abused: None     Forced sexual activity: None   Other Topics Concern     None   Social History Narrative     None       REVIEW OF SYSTEM:  Per HPI    PHYSICAL EXAM:   General appearance: alert, appears stated age and cooperative  HEENT: Head is normocephalic with normal hair distribution. Facial trauma with bruising and hematoma to forehead. Ears: Without lesions or deformity. No acute purulent discharge. [place]. Eyes: Conjunctivae pink with no scleral icterus or erythema. Nose: Normal mucosa and septum. Oropharnyx: mmm, no lesions present.  Lungs: respirations without effort, LS CTA  Cardiovascular: S1, S2, irregular rhythm, regular rate  Abdomen: soft, tender to lower abdomen, +CVAT  Extremities: extremities normal, atraumatic, no cyanosis. Massive amounts of lymphedema present.   Back: kyphosis  Pulses: 2+ and symmetric  Skin: Skin color, texture, turgor normal. No rashes or lesions. Bruising over face, right shoulder.  Neurologic: Grossly normal   Psych: interacts well with caregivers, does NOT exhibits logical thought processes and connections--advanced dementia with delusions, pleasant    LABS:   CBC, CMP, Procalcitonin, UA/UC.      ASSESSMENT:      ICD-10-CM    1. Temporal arteritis (H) M31.6    2. Delusions (H) F22    3. Falls frequently R29.6        PLAN:    Fall  -Please place on fall precautions and monitor patient routinely.  -Remove foot pedals from wheelchair per son's request.   -Encouraged patient to ask for help with all transfers.   -Continue to ice sore areas.   -Tylenol 500 mg by mouth every 6 hours as needed for pain.  -Continue to assess for developing injuries and if patient reports any pain request provider follow-up.    Delusions/Temporal Arteritis  -CRP, Sed Rate, CBC, CMP STAT.   -If CRP/Sed Rate elevated increase Prednisone to 20 mg by mouth daily.   -Follow closely.     Otherwise continue current care plan for all other chronic medical conditions. Encouraged patient to engage in healthy lifestyle behaviors such as engaging in social activities, exercising (PT/OT), eating well, and following care plan. Follow up for routine check-up, or sooner if needed. Will continue to monitor patient and work with nursing staff collaboratively to work toward positive patient outcomes.    Total unit/floor time of 45 minutes time consisted of the following: time spent with patient, examination of patient, reviewing the record including pertinent labs and completing documentation. Coordination of care time with nursing staff and other healthcare providers 25 minutes, this time was spent on discussion/counseling on current care plan including medical management of chronic health problems and acute health problems, education pertaining to plan, and discussion of the goals of care pertaining to the current outlined plan with nursing staff, son Marc and patient.    Electronically signed by: Mayela Yañez CNP

## 2021-08-03 PROBLEM — I48.91 ATRIAL FIBRILLATION WITH RAPID VENTRICULAR RESPONSE (H): Status: RESOLVED | Noted: 2019-01-01 | Resolved: 2019-01-01

## 2021-08-03 PROBLEM — F22 DELUSIONS (H): Status: RESOLVED | Noted: 2019-01-01 | Resolved: 2019-01-01
